# Patient Record
Sex: FEMALE | Race: WHITE | Employment: OTHER | URBAN - METROPOLITAN AREA
[De-identification: names, ages, dates, MRNs, and addresses within clinical notes are randomized per-mention and may not be internally consistent; named-entity substitution may affect disease eponyms.]

---

## 2019-09-25 ENCOUNTER — OFFICE VISIT (OUTPATIENT)
Dept: ORTHOPEDIC SURGERY | Age: 84
End: 2019-09-25
Payer: MEDICARE

## 2019-09-25 VITALS — BODY MASS INDEX: 21.53 KG/M2 | WEIGHT: 117 LBS | HEIGHT: 62 IN

## 2019-09-25 DIAGNOSIS — S83.207A TEAR OF MENISCUS OF LEFT KNEE, UNSPECIFIED MENISCUS, UNSPECIFIED TEAR TYPE, UNSPECIFIED WHETHER OLD OR CURRENT TEAR: ICD-10-CM

## 2019-09-25 DIAGNOSIS — M25.562 LEFT KNEE PAIN, UNSPECIFIED CHRONICITY: Primary | ICD-10-CM

## 2019-09-25 PROCEDURE — 1090F PRES/ABSN URINE INCON ASSESS: CPT | Performed by: ORTHOPAEDIC SURGERY

## 2019-09-25 PROCEDURE — 4004F PT TOBACCO SCREEN RCVD TLK: CPT | Performed by: ORTHOPAEDIC SURGERY

## 2019-09-25 PROCEDURE — 1123F ACP DISCUSS/DSCN MKR DOCD: CPT | Performed by: ORTHOPAEDIC SURGERY

## 2019-09-25 PROCEDURE — G8428 CUR MEDS NOT DOCUMENT: HCPCS | Performed by: ORTHOPAEDIC SURGERY

## 2019-09-25 PROCEDURE — 4040F PNEUMOC VAC/ADMIN/RCVD: CPT | Performed by: ORTHOPAEDIC SURGERY

## 2019-09-25 PROCEDURE — 99214 OFFICE O/P EST MOD 30 MIN: CPT | Performed by: ORTHOPAEDIC SURGERY

## 2019-09-25 PROCEDURE — G8400 PT W/DXA NO RESULTS DOC: HCPCS | Performed by: ORTHOPAEDIC SURGERY

## 2019-09-25 PROCEDURE — G8420 CALC BMI NORM PARAMETERS: HCPCS | Performed by: ORTHOPAEDIC SURGERY

## 2019-09-25 RX ORDER — ISOSORBIDE MONONITRATE 30 MG/1
30 TABLET, EXTENDED RELEASE ORAL 2 TIMES DAILY
COMMUNITY
Start: 2017-08-20

## 2019-09-25 RX ORDER — POTASSIUM CHLORIDE 20 MEQ/1
20 TABLET, EXTENDED RELEASE ORAL DAILY
COMMUNITY
Start: 2017-08-20

## 2019-09-25 RX ORDER — CLOPIDOGREL BISULFATE 75 MG/1
TABLET ORAL
COMMUNITY
Start: 2013-07-08 | End: 2019-11-25 | Stop reason: ALTCHOICE

## 2019-09-25 NOTE — PROGRESS NOTES
degrees of flexion. Negative Lachman's and posterior drawer. Negative patellar grind test and J sign. Compartments soft and compressible throughout leg. Active range of motion 0-120 with pain. Positive Maritza's positive Apley's, gait is antalgic        Imaging:  Xr Knee Left (min 4 Views)    Result Date: 9/25/2019  LOCATION: 200 EXAM: XR KNEE LEFT (MIN 4 VIEWS) COMPARISON: None HISTORY: Knee pain TECHNIQUE: 4 views of the left knee. FINDINGS: Mild tricompartmental degenerative changes are noted, greatest in the medial compartment. There is no joint effusion. There is normal alignment. The patella is well located. No fractures are identified. 1.  Mild degenerative changes as above. 2.  No acute findings. Herbert Hutchinson was seen today for knee pain. Diagnoses and all orders for this visit:    Left knee pain, unspecified chronicity  -     XR KNEE LEFT (MIN 4 VIEWS); Future    Tear of meniscus of left knee, unspecified meniscus, unspecified tear type, unspecified whether old or current tear  -     MRI Knee Left WO Contrast; Future        Patient seen and examined. X-rays reviewed. Patient has little to no arthritis noted on x-rays today. However patient's main complaint is instability of symptomatic knee. Exam and history is consistent with possible meniscus tear. MRI recommended for further evaluation management. Follow-up after MRI        Denys Hartmann DO         25 minutes was spent with patient. 50% or greater was spent counseling the patient.

## 2019-10-09 ENCOUNTER — HOSPITAL ENCOUNTER (OUTPATIENT)
Dept: MRI IMAGING | Age: 84
Discharge: HOME | End: 2019-10-11
Payer: MEDICARE

## 2019-10-09 DIAGNOSIS — S83.207A TEAR OF MENISCUS OF LEFT KNEE, UNSPECIFIED MENISCUS, UNSPECIFIED TEAR TYPE, UNSPECIFIED WHETHER OLD OR CURRENT TEAR: ICD-10-CM

## 2019-10-09 PROCEDURE — 73721 MRI JNT OF LWR EXTRE W/O DYE: CPT

## 2019-10-14 ENCOUNTER — OFFICE VISIT (OUTPATIENT)
Dept: ORTHOPEDIC SURGERY | Age: 84
End: 2019-10-14
Payer: MEDICARE

## 2019-10-14 VITALS — BODY MASS INDEX: 21.53 KG/M2 | WEIGHT: 117 LBS | HEIGHT: 62 IN | TEMPERATURE: 98 F

## 2019-10-14 DIAGNOSIS — S83.242A ACUTE MEDIAL MENISCUS TEAR, LEFT, INITIAL ENCOUNTER: ICD-10-CM

## 2019-10-14 DIAGNOSIS — M17.12 PRIMARY OSTEOARTHRITIS OF LEFT KNEE: Primary | ICD-10-CM

## 2019-10-14 PROCEDURE — G8427 DOCREV CUR MEDS BY ELIG CLIN: HCPCS | Performed by: ORTHOPAEDIC SURGERY

## 2019-10-14 PROCEDURE — 1123F ACP DISCUSS/DSCN MKR DOCD: CPT | Performed by: ORTHOPAEDIC SURGERY

## 2019-10-14 PROCEDURE — G8484 FLU IMMUNIZE NO ADMIN: HCPCS | Performed by: ORTHOPAEDIC SURGERY

## 2019-10-14 PROCEDURE — G8400 PT W/DXA NO RESULTS DOC: HCPCS | Performed by: ORTHOPAEDIC SURGERY

## 2019-10-14 PROCEDURE — 4040F PNEUMOC VAC/ADMIN/RCVD: CPT | Performed by: ORTHOPAEDIC SURGERY

## 2019-10-14 PROCEDURE — 4004F PT TOBACCO SCREEN RCVD TLK: CPT | Performed by: ORTHOPAEDIC SURGERY

## 2019-10-14 PROCEDURE — 99214 OFFICE O/P EST MOD 30 MIN: CPT | Performed by: ORTHOPAEDIC SURGERY

## 2019-10-14 PROCEDURE — G8420 CALC BMI NORM PARAMETERS: HCPCS | Performed by: ORTHOPAEDIC SURGERY

## 2019-10-14 PROCEDURE — 1090F PRES/ABSN URINE INCON ASSESS: CPT | Performed by: ORTHOPAEDIC SURGERY

## 2019-11-26 ENCOUNTER — HOSPITAL ENCOUNTER (OUTPATIENT)
Age: 84
Discharge: HOME | End: 2019-11-28
Payer: MEDICARE

## 2019-11-26 DIAGNOSIS — S83.242D TEAR OF MEDIAL MENISCUS OF LEFT KNEE, UNSPECIFIED TEAR TYPE, UNSPECIFIED WHETHER OLD OR CURRENT TEAR, SUBSEQUENT ENCOUNTER: ICD-10-CM

## 2019-11-26 LAB
ANION GAP SERPL CALCULATED.3IONS-SCNC: 16 MMOL/L (ref 7–16)
BUN BLDV-MCNC: 22 MG/DL (ref 8–23)
CALCIUM SERPL-MCNC: 9.3 MG/DL (ref 8.6–10.2)
CHLORIDE BLD-SCNC: 105 MMOL/L (ref 98–107)
CO2: 20 MMOL/L (ref 22–29)
CREAT SERPL-MCNC: 1.3 MG/DL (ref 0.5–1)
GFR AFRICAN AMERICAN: 47
GFR NON-AFRICAN AMERICAN: 39 ML/MIN/1.73
GLUCOSE BLD-MCNC: 96 MG/DL (ref 74–99)
HCT VFR BLD CALC: 43 % (ref 34–48)
HEMOGLOBIN: 12.8 G/DL (ref 11.5–15.5)
MCH RBC QN AUTO: 28.2 PG (ref 26–35)
MCHC RBC AUTO-ENTMCNC: 29.8 % (ref 32–34.5)
MCV RBC AUTO: 94.7 FL (ref 80–99.9)
PDW BLD-RTO: 14.4 FL (ref 11.5–15)
PLATELET # BLD: 216 E9/L (ref 130–450)
PMV BLD AUTO: 11.1 FL (ref 7–12)
POTASSIUM SERPL-SCNC: 5.1 MMOL/L (ref 3.5–5)
RBC # BLD: 4.54 E12/L (ref 3.5–5.5)
SODIUM BLD-SCNC: 141 MMOL/L (ref 132–146)
WBC # BLD: 7.3 E9/L (ref 4.5–11.5)

## 2019-11-26 PROCEDURE — 85027 COMPLETE CBC AUTOMATED: CPT

## 2019-11-26 PROCEDURE — 36415 COLL VENOUS BLD VENIPUNCTURE: CPT

## 2019-11-26 PROCEDURE — 80048 BASIC METABOLIC PNL TOTAL CA: CPT

## 2019-12-02 ENCOUNTER — ANESTHESIA EVENT (OUTPATIENT)
Dept: OPERATING ROOM | Age: 84
End: 2019-12-02
Payer: MEDICARE

## 2019-12-06 ENCOUNTER — ANESTHESIA (OUTPATIENT)
Dept: OPERATING ROOM | Age: 84
End: 2019-12-06
Payer: MEDICARE

## 2019-12-11 ENCOUNTER — HOSPITAL ENCOUNTER (OUTPATIENT)
Age: 84
Setting detail: OUTPATIENT SURGERY
Discharge: HOME | End: 2019-12-11
Attending: ORTHOPAEDIC SURGERY | Admitting: ORTHOPAEDIC SURGERY
Payer: MEDICARE

## 2019-12-11 VITALS
RESPIRATION RATE: 17 BRPM | DIASTOLIC BLOOD PRESSURE: 47 MMHG | OXYGEN SATURATION: 100 % | SYSTOLIC BLOOD PRESSURE: 132 MMHG | TEMPERATURE: 95.9 F

## 2019-12-11 VITALS
SYSTOLIC BLOOD PRESSURE: 150 MMHG | RESPIRATION RATE: 20 BRPM | BODY MASS INDEX: 20.91 KG/M2 | DIASTOLIC BLOOD PRESSURE: 80 MMHG | TEMPERATURE: 97.7 F | HEIGHT: 63 IN | OXYGEN SATURATION: 98 % | HEART RATE: 55 BPM | WEIGHT: 118 LBS

## 2019-12-11 DIAGNOSIS — S83.242D TEAR OF MEDIAL MENISCUS OF LEFT KNEE, UNSPECIFIED TEAR TYPE, UNSPECIFIED WHETHER OLD OR CURRENT TEAR, SUBSEQUENT ENCOUNTER: Primary | ICD-10-CM

## 2019-12-11 DIAGNOSIS — M17.12 PRIMARY OSTEOARTHRITIS OF LEFT KNEE: ICD-10-CM

## 2019-12-11 LAB
ANION GAP SERPL CALCULATED.3IONS-SCNC: 10 MMOL/L (ref 7–16)
BUN BLDV-MCNC: 24 MG/DL (ref 8–23)
CALCIUM SERPL-MCNC: 9.5 MG/DL (ref 8.6–10.2)
CHLORIDE BLD-SCNC: 109 MMOL/L (ref 98–107)
CO2: 26 MMOL/L (ref 22–29)
CREAT SERPL-MCNC: 1.3 MG/DL (ref 0.5–1)
GFR AFRICAN AMERICAN: 47
GFR NON-AFRICAN AMERICAN: 39 ML/MIN/1.73
GLUCOSE BLD-MCNC: 99 MG/DL (ref 74–99)
POTASSIUM REFLEX MAGNESIUM: 4.6 MMOL/L (ref 3.5–5)
SODIUM BLD-SCNC: 145 MMOL/L (ref 132–146)

## 2019-12-11 PROCEDURE — 3600000003 HC SURGERY LEVEL 3 BASE: Performed by: ORTHOPAEDIC SURGERY

## 2019-12-11 PROCEDURE — 7100000011 HC PHASE II RECOVERY - ADDTL 15 MIN: Performed by: ORTHOPAEDIC SURGERY

## 2019-12-11 PROCEDURE — 2709999900 HC NON-CHARGEABLE SUPPLY: Performed by: ORTHOPAEDIC SURGERY

## 2019-12-11 PROCEDURE — 2500000003 HC RX 250 WO HCPCS: Performed by: ORTHOPAEDIC SURGERY

## 2019-12-11 PROCEDURE — 3700000000 HC ANESTHESIA ATTENDED CARE: Performed by: ORTHOPAEDIC SURGERY

## 2019-12-11 PROCEDURE — 7100000010 HC PHASE II RECOVERY - FIRST 15 MIN: Performed by: ORTHOPAEDIC SURGERY

## 2019-12-11 PROCEDURE — 29880 ARTHRS KNE SRG MNISECTMY M&L: CPT | Performed by: ORTHOPAEDIC SURGERY

## 2019-12-11 PROCEDURE — 2580000003 HC RX 258: Performed by: ANESTHESIOLOGY

## 2019-12-11 PROCEDURE — 2500000003 HC RX 250 WO HCPCS

## 2019-12-11 PROCEDURE — 3700000001 HC ADD 15 MINUTES (ANESTHESIA): Performed by: ORTHOPAEDIC SURGERY

## 2019-12-11 PROCEDURE — 80048 BASIC METABOLIC PNL TOTAL CA: CPT

## 2019-12-11 PROCEDURE — 7100000000 HC PACU RECOVERY - FIRST 15 MIN: Performed by: ORTHOPAEDIC SURGERY

## 2019-12-11 PROCEDURE — 6360000002 HC RX W HCPCS: Performed by: ORTHOPAEDIC SURGERY

## 2019-12-11 PROCEDURE — 36415 COLL VENOUS BLD VENIPUNCTURE: CPT

## 2019-12-11 PROCEDURE — 6360000002 HC RX W HCPCS: Performed by: ANESTHESIOLOGY

## 2019-12-11 PROCEDURE — 7100000001 HC PACU RECOVERY - ADDTL 15 MIN: Performed by: ORTHOPAEDIC SURGERY

## 2019-12-11 PROCEDURE — 6360000002 HC RX W HCPCS

## 2019-12-11 PROCEDURE — 6370000000 HC RX 637 (ALT 250 FOR IP): Performed by: ANESTHESIOLOGY

## 2019-12-11 PROCEDURE — 3600000013 HC SURGERY LEVEL 3 ADDTL 15MIN: Performed by: ORTHOPAEDIC SURGERY

## 2019-12-11 RX ORDER — BUPIVACAINE HYDROCHLORIDE 2.5 MG/ML
INJECTION, SOLUTION EPIDURAL; INFILTRATION; INTRACAUDAL PRN
Status: DISCONTINUED | OUTPATIENT
Start: 2019-12-11 | End: 2019-12-11 | Stop reason: ALTCHOICE

## 2019-12-11 RX ORDER — PROPOFOL 10 MG/ML
INJECTION, EMULSION INTRAVENOUS PRN
Status: DISCONTINUED | OUTPATIENT
Start: 2019-12-11 | End: 2019-12-11 | Stop reason: SDUPTHER

## 2019-12-11 RX ORDER — FENTANYL CITRATE 50 UG/ML
INJECTION, SOLUTION INTRAMUSCULAR; INTRAVENOUS PRN
Status: DISCONTINUED | OUTPATIENT
Start: 2019-12-11 | End: 2019-12-11 | Stop reason: SDUPTHER

## 2019-12-11 RX ORDER — HYDROCODONE BITARTRATE AND ACETAMINOPHEN 7.5; 325 MG/1; MG/1
1 TABLET ORAL ONCE
Status: CANCELLED | OUTPATIENT
Start: 2019-12-11

## 2019-12-11 RX ORDER — HYDROMORPHONE HYDROCHLORIDE 1 MG/ML
0.25 INJECTION, SOLUTION INTRAMUSCULAR; INTRAVENOUS; SUBCUTANEOUS EVERY 5 MIN PRN
Status: DISCONTINUED | OUTPATIENT
Start: 2019-12-11 | End: 2019-12-11 | Stop reason: HOSPADM

## 2019-12-11 RX ORDER — LIDOCAINE HYDROCHLORIDE 20 MG/ML
INJECTION, SOLUTION INTRAVENOUS PRN
Status: DISCONTINUED | OUTPATIENT
Start: 2019-12-11 | End: 2019-12-11 | Stop reason: SDUPTHER

## 2019-12-11 RX ORDER — HYDROMORPHONE HYDROCHLORIDE 1 MG/ML
0.5 INJECTION, SOLUTION INTRAMUSCULAR; INTRAVENOUS; SUBCUTANEOUS EVERY 5 MIN PRN
Status: DISCONTINUED | OUTPATIENT
Start: 2019-12-11 | End: 2019-12-11 | Stop reason: HOSPADM

## 2019-12-11 RX ORDER — ASPIRIN 325 MG
325 TABLET, DELAYED RELEASE (ENTERIC COATED) ORAL 2 TIMES DAILY
Qty: 56 TABLET | Refills: 0 | Status: SHIPPED | OUTPATIENT
Start: 2019-12-11 | End: 2019-12-11 | Stop reason: HOSPADM

## 2019-12-11 RX ORDER — OXYCODONE HYDROCHLORIDE AND ACETAMINOPHEN 5; 325 MG/1; MG/1
1 TABLET ORAL EVERY 6 HOURS PRN
Qty: 28 TABLET | Refills: 0 | Status: SHIPPED | OUTPATIENT
Start: 2019-12-11 | End: 2019-12-11 | Stop reason: HOSPADM

## 2019-12-11 RX ORDER — DEXAMETHASONE SODIUM PHOSPHATE 10 MG/ML
INJECTION, SOLUTION INTRAMUSCULAR; INTRAVENOUS PRN
Status: DISCONTINUED | OUTPATIENT
Start: 2019-12-11 | End: 2019-12-11 | Stop reason: SDUPTHER

## 2019-12-11 RX ORDER — CEFAZOLIN SODIUM 2 G/50ML
2 SOLUTION INTRAVENOUS ONCE
Status: COMPLETED | OUTPATIENT
Start: 2019-12-11 | End: 2019-12-11

## 2019-12-11 RX ORDER — MEPERIDINE HYDROCHLORIDE 50 MG/ML
12.5 INJECTION INTRAMUSCULAR; INTRAVENOUS; SUBCUTANEOUS EVERY 5 MIN PRN
Status: COMPLETED | OUTPATIENT
Start: 2019-12-11 | End: 2019-12-11

## 2019-12-11 RX ORDER — HYDROCODONE BITARTRATE AND ACETAMINOPHEN 5; 325 MG/1; MG/1
1 TABLET ORAL ONCE
Status: COMPLETED | OUTPATIENT
Start: 2019-12-11 | End: 2019-12-11

## 2019-12-11 RX ORDER — EPINEPHRINE 1 MG/ML
INJECTION, SOLUTION, CONCENTRATE INTRAVENOUS PRN
Status: DISCONTINUED | OUTPATIENT
Start: 2019-12-11 | End: 2019-12-11 | Stop reason: ALTCHOICE

## 2019-12-11 RX ORDER — ASPIRIN 81 MG/1
81 TABLET ORAL DAILY
Qty: 56 TABLET | Refills: 0 | Status: SHIPPED | OUTPATIENT
Start: 2019-12-11 | End: 2020-01-08

## 2019-12-11 RX ORDER — MEPERIDINE HYDROCHLORIDE 25 MG/ML
INJECTION INTRAMUSCULAR; INTRAVENOUS; SUBCUTANEOUS
Status: DISCONTINUED
Start: 2019-12-11 | End: 2019-12-11 | Stop reason: HOSPADM

## 2019-12-11 RX ORDER — MEPERIDINE HYDROCHLORIDE 50 MG/ML
25 INJECTION INTRAMUSCULAR; INTRAVENOUS; SUBCUTANEOUS ONCE
Status: DISCONTINUED | OUTPATIENT
Start: 2019-12-11 | End: 2019-12-11 | Stop reason: ALTCHOICE

## 2019-12-11 RX ORDER — SODIUM CHLORIDE, SODIUM LACTATE, POTASSIUM CHLORIDE, CALCIUM CHLORIDE 600; 310; 30; 20 MG/100ML; MG/100ML; MG/100ML; MG/100ML
INJECTION, SOLUTION INTRAVENOUS CONTINUOUS
Status: DISCONTINUED | OUTPATIENT
Start: 2019-12-11 | End: 2019-12-11 | Stop reason: HOSPADM

## 2019-12-11 RX ORDER — HYDROCODONE BITARTRATE AND ACETAMINOPHEN 5; 325 MG/1; MG/1
1 TABLET ORAL EVERY 6 HOURS PRN
Qty: 28 TABLET | Refills: 0 | Status: SHIPPED | OUTPATIENT
Start: 2019-12-11 | End: 2019-12-11 | Stop reason: HOSPADM

## 2019-12-11 RX ORDER — GLYCOPYRROLATE 1 MG/5 ML
SYRINGE (ML) INTRAVENOUS PRN
Status: DISCONTINUED | OUTPATIENT
Start: 2019-12-11 | End: 2019-12-11 | Stop reason: SDUPTHER

## 2019-12-11 RX ORDER — HYDROCODONE BITARTRATE AND ACETAMINOPHEN 5; 325 MG/1; MG/1
1 TABLET ORAL EVERY 4 HOURS PRN
Qty: 42 TABLET | Refills: 0 | Status: SHIPPED | OUTPATIENT
Start: 2019-12-11 | End: 2019-12-18

## 2019-12-11 RX ORDER — MEPERIDINE HYDROCHLORIDE 50 MG/ML
25 INJECTION INTRAMUSCULAR; INTRAVENOUS; SUBCUTANEOUS ONCE
Status: CANCELLED | OUTPATIENT
Start: 2019-12-11

## 2019-12-11 RX ORDER — ONDANSETRON 2 MG/ML
INJECTION INTRAMUSCULAR; INTRAVENOUS PRN
Status: DISCONTINUED | OUTPATIENT
Start: 2019-12-11 | End: 2019-12-11 | Stop reason: SDUPTHER

## 2019-12-11 RX ADMIN — FENTANYL CITRATE 50 MCG: 50 INJECTION, SOLUTION INTRAMUSCULAR; INTRAVENOUS at 07:18

## 2019-12-11 RX ADMIN — DEXAMETHASONE SODIUM PHOSPHATE 10 MG: 10 INJECTION, SOLUTION INTRAMUSCULAR; INTRAVENOUS at 07:05

## 2019-12-11 RX ADMIN — Medication 0.1 MG: at 07:09

## 2019-12-11 RX ADMIN — CEFAZOLIN SODIUM 2 G: 2 SOLUTION INTRAVENOUS at 07:09

## 2019-12-11 RX ADMIN — ONDANSETRON HYDROCHLORIDE 4 MG: 2 INJECTION, SOLUTION INTRAMUSCULAR; INTRAVENOUS at 07:05

## 2019-12-11 RX ADMIN — FENTANYL CITRATE 50 MCG: 50 INJECTION, SOLUTION INTRAMUSCULAR; INTRAVENOUS at 07:35

## 2019-12-11 RX ADMIN — FENTANYL CITRATE 50 MCG: 50 INJECTION, SOLUTION INTRAMUSCULAR; INTRAVENOUS at 07:05

## 2019-12-11 RX ADMIN — HYDROMORPHONE HYDROCHLORIDE 0.25 MG: 1 INJECTION, SOLUTION INTRAMUSCULAR; INTRAVENOUS; SUBCUTANEOUS at 08:05

## 2019-12-11 RX ADMIN — MEPERIDINE HYDROCHLORIDE 12.5 MG: 50 INJECTION, SOLUTION INTRAMUSCULAR; INTRAVENOUS; SUBCUTANEOUS at 08:14

## 2019-12-11 RX ADMIN — LIDOCAINE HYDROCHLORIDE 60 MG: 20 INJECTION, SOLUTION INTRAVENOUS at 07:05

## 2019-12-11 RX ADMIN — HYDROCODONE BITARTRATE AND ACETAMINOPHEN 1 TABLET: 5; 325 TABLET ORAL at 11:33

## 2019-12-11 RX ADMIN — HYDROMORPHONE HYDROCHLORIDE 0.25 MG: 1 INJECTION, SOLUTION INTRAMUSCULAR; INTRAVENOUS; SUBCUTANEOUS at 08:10

## 2019-12-11 RX ADMIN — MEPERIDINE HYDROCHLORIDE 12.5 MG: 50 INJECTION, SOLUTION INTRAMUSCULAR; INTRAVENOUS; SUBCUTANEOUS at 08:19

## 2019-12-11 RX ADMIN — SODIUM CHLORIDE, POTASSIUM CHLORIDE, SODIUM LACTATE AND CALCIUM CHLORIDE: 600; 310; 30; 20 INJECTION, SOLUTION INTRAVENOUS at 07:00

## 2019-12-11 RX ADMIN — MEPERIDINE HYDROCHLORIDE 25 MG: 50 INJECTION, SOLUTION INTRAMUSCULAR; INTRAVENOUS; SUBCUTANEOUS at 08:30

## 2019-12-11 RX ADMIN — PROPOFOL 150 MG: 10 INJECTION, EMULSION INTRAVENOUS at 07:05

## 2019-12-11 ASSESSMENT — PULMONARY FUNCTION TESTS
PIF_VALUE: 1
PIF_VALUE: 8
PIF_VALUE: 4
PIF_VALUE: 8
PIF_VALUE: 1
PIF_VALUE: 8
PIF_VALUE: 1
PIF_VALUE: 8
PIF_VALUE: 2
PIF_VALUE: 8
PIF_VALUE: 5
PIF_VALUE: 8
PIF_VALUE: 8
PIF_VALUE: 5
PIF_VALUE: 8
PIF_VALUE: 15
PIF_VALUE: 22
PIF_VALUE: 9
PIF_VALUE: 8
PIF_VALUE: 15
PIF_VALUE: 8
PIF_VALUE: 14
PIF_VALUE: 2
PIF_VALUE: 26
PIF_VALUE: 3
PIF_VALUE: 5
PIF_VALUE: 16
PIF_VALUE: 5
PIF_VALUE: 8
PIF_VALUE: 16
PIF_VALUE: 8
PIF_VALUE: 5
PIF_VALUE: 8
PIF_VALUE: 8

## 2019-12-11 ASSESSMENT — PAIN DESCRIPTION - PAIN TYPE
TYPE: SURGICAL PAIN
TYPE: SURGICAL PAIN

## 2019-12-11 ASSESSMENT — PAIN - FUNCTIONAL ASSESSMENT: PAIN_FUNCTIONAL_ASSESSMENT: 0-10

## 2019-12-11 ASSESSMENT — PAIN DESCRIPTION - FREQUENCY: FREQUENCY: INTERMITTENT

## 2019-12-11 ASSESSMENT — PAIN DESCRIPTION - LOCATION
LOCATION: KNEE
LOCATION: KNEE

## 2019-12-11 ASSESSMENT — PAIN SCALES - GENERAL
PAINLEVEL_OUTOF10: 0
PAINLEVEL_OUTOF10: 3
PAINLEVEL_OUTOF10: 6
PAINLEVEL_OUTOF10: 4
PAINLEVEL_OUTOF10: 6
PAINLEVEL_OUTOF10: 4
PAINLEVEL_OUTOF10: 6
PAINLEVEL_OUTOF10: 6
PAINLEVEL_OUTOF10: 0
PAINLEVEL_OUTOF10: 0
PAINLEVEL_OUTOF10: 1

## 2019-12-11 ASSESSMENT — LIFESTYLE VARIABLES: SMOKING_STATUS: 1

## 2019-12-11 ASSESSMENT — PAIN DESCRIPTION - DESCRIPTORS
DESCRIPTORS: ACHING
DESCRIPTORS: ACHING

## 2019-12-11 ASSESSMENT — PAIN DESCRIPTION - ORIENTATION
ORIENTATION: RIGHT
ORIENTATION: LEFT

## 2019-12-17 ENCOUNTER — OFFICE VISIT (OUTPATIENT)
Dept: ORTHOPEDIC SURGERY | Age: 84
End: 2019-12-17

## 2019-12-17 VITALS — BODY MASS INDEX: 21.71 KG/M2 | WEIGHT: 118 LBS | HEIGHT: 62 IN

## 2019-12-17 DIAGNOSIS — Z98.890 S/P LEFT KNEE ARTHROSCOPY: Primary | ICD-10-CM

## 2019-12-17 PROCEDURE — 99024 POSTOP FOLLOW-UP VISIT: CPT | Performed by: PHYSICIAN ASSISTANT

## 2020-01-15 ENCOUNTER — OFFICE VISIT (OUTPATIENT)
Dept: ORTHOPEDIC SURGERY | Age: 85
End: 2020-01-15

## 2020-01-15 VITALS — BODY MASS INDEX: 21.71 KG/M2 | WEIGHT: 118 LBS | HEIGHT: 62 IN | TEMPERATURE: 98 F

## 2020-01-15 PROCEDURE — 99024 POSTOP FOLLOW-UP VISIT: CPT | Performed by: NURSE PRACTITIONER

## 2020-01-15 RX ORDER — BUDESONIDE 3 MG/1
CAPSULE, COATED PELLETS ORAL
Refills: 0 | COMMUNITY
Start: 2019-12-11

## 2020-01-15 RX ORDER — LORAZEPAM 0.5 MG/1
TABLET ORAL
COMMUNITY
Start: 2019-12-24 | End: 2020-01-15

## 2020-01-15 NOTE — PROGRESS NOTES
Subjective:        Ori Martínez is here for follow-up after left knee arthroscopy. Findings at surgery: medial meniscus tear, lateral meniscus tear, synovitis, DJD   The patient is not having any pain. . She denies fever, wound drainage, increasing redness, pus, increasing pain, increasing swelling. Post op problems reported: none. She is ambulating. Objective:           General :    alert, appears stated age and cooperative   Gait:  Normal.   Sutures:   out   Incision:  healing well, no significant drainage, no dehiscence, no significant erythema   Tenderness:  none   Flexion ROM:  full range of motion   Extension ROM:  full range of motion   Effusion:  none   DVT Evaluation:  No evidence of DVT seen on physical exam.           Assessment:     S/p:    PREOPERATIVE DIAGNOSES: (1) Left knee medial meniscus tear. (2)Tricompartmental synovitis. (3) DJD knee   POSTOPERATIVE DIAGNOSIS: Same as above +lateral meniscus tear  PROCEDURE: (1) Left knee arthroscopy with posterior horn medial   meniscectomy. (2) Tricompartmental synovectomy.  (3) Chondroplasty of mfc/patella/trochlea(4) lateral menisectomy     Plan:     HEP  Activity as tolerated  Fu prn

## 2020-07-28 ENCOUNTER — HOSPITAL ENCOUNTER (OUTPATIENT)
Dept: ULTRASOUND IMAGING | Age: 85
Discharge: HOME | End: 2020-07-30
Payer: MEDICARE

## 2020-07-28 PROCEDURE — 76770 US EXAM ABDO BACK WALL COMP: CPT

## 2020-07-28 PROCEDURE — 93975 VASCULAR STUDY: CPT

## 2020-11-17 ENCOUNTER — HOSPITAL ENCOUNTER (OUTPATIENT)
Dept: NUCLEAR MEDICINE | Age: 85
Discharge: HOME | End: 2020-11-17
Payer: MEDICARE

## 2020-11-17 PROCEDURE — A9572 INDIUM IN-111 PENTETREOTIDE: HCPCS | Performed by: RADIOLOGY

## 2020-11-17 PROCEDURE — 3430000000 HC RX DIAGNOSTIC RADIOPHARMACEUTICAL: Performed by: RADIOLOGY

## 2020-11-17 PROCEDURE — 78804 RP LOCLZJ TUM WHBDY 2+D IMG: CPT

## 2020-11-17 RX ADMIN — INDIUM IN -111 PENTETREOTIDE 6 MILLICURIE: KIT at 09:48

## 2020-11-18 ENCOUNTER — HOSPITAL ENCOUNTER (OUTPATIENT)
Dept: NUCLEAR MEDICINE | Age: 85
Discharge: HOME | End: 2020-11-18
Payer: MEDICARE

## 2020-11-18 PROCEDURE — 78831 RP LOCLZJ TUM SPECT 2 AREAS: CPT

## 2020-11-19 ENCOUNTER — HOSPITAL ENCOUNTER (OUTPATIENT)
Dept: NUCLEAR MEDICINE | Age: 85
Discharge: HOME | End: 2020-11-19
Payer: MEDICARE

## 2021-10-04 ENCOUNTER — HOSPITAL ENCOUNTER (OUTPATIENT)
Facility: HOSPITAL | Age: 86
Setting detail: OBSERVATION
Discharge: HOME | End: 2021-10-05
Attending: EMERGENCY MEDICINE | Admitting: INTERNAL MEDICINE

## 2021-10-04 ENCOUNTER — APPOINTMENT (OUTPATIENT)
Dept: GENERAL RADIOLOGY | Facility: HOSPITAL | Age: 86
End: 2021-10-04

## 2021-10-04 ENCOUNTER — APPOINTMENT (OUTPATIENT)
Dept: CT IMAGING | Facility: HOSPITAL | Age: 86
End: 2021-10-04

## 2021-10-04 DIAGNOSIS — Z20.822 LAB TEST NEGATIVE FOR COVID-19 VIRUS: ICD-10-CM

## 2021-10-04 DIAGNOSIS — I10 HYPERTENSION, UNSPECIFIED TYPE: ICD-10-CM

## 2021-10-04 DIAGNOSIS — R07.9 CHEST PAIN, UNSPECIFIED TYPE: Primary | ICD-10-CM

## 2021-10-04 LAB
ALBUMIN SERPL-MCNC: 3.6 G/DL (ref 3.5–5.2)
ALBUMIN/GLOB SERPL: 1.9 G/DL
ALP SERPL-CCNC: 72 U/L (ref 39–117)
ALT SERPL W P-5'-P-CCNC: 10 U/L (ref 1–33)
ANION GAP SERPL CALCULATED.3IONS-SCNC: 12 MMOL/L (ref 5–15)
APTT PPP: 24.2 SECONDS (ref 24–31)
AST SERPL-CCNC: 14 U/L (ref 1–32)
BASOPHILS # BLD AUTO: 0.1 10*3/MM3 (ref 0–0.2)
BASOPHILS NFR BLD AUTO: 0.8 % (ref 0–1.5)
BILIRUB SERPL-MCNC: <0.2 MG/DL (ref 0–1.2)
BUN SERPL-MCNC: 36 MG/DL (ref 8–23)
BUN/CREAT SERPL: 22.6 (ref 7–25)
CALCIUM SPEC-SCNC: 8.5 MG/DL (ref 8.6–10.5)
CHLORIDE SERPL-SCNC: 108 MMOL/L (ref 98–107)
CO2 SERPL-SCNC: 24 MMOL/L (ref 22–29)
CREAT SERPL-MCNC: 1.59 MG/DL (ref 0.57–1)
D DIMER PPP FEU-MCNC: 1.16 MG/L (FEU) (ref 0–0.59)
DEPRECATED RDW RBC AUTO: 45.5 FL (ref 37–54)
EOSINOPHIL # BLD AUTO: 0.5 10*3/MM3 (ref 0–0.4)
EOSINOPHIL NFR BLD AUTO: 5.3 % (ref 0.3–6.2)
ERYTHROCYTE [DISTWIDTH] IN BLOOD BY AUTOMATED COUNT: 14.2 % (ref 12.3–15.4)
GFR SERPL CREATININE-BSD FRML MDRD: 31 ML/MIN/1.73
GLOBULIN UR ELPH-MCNC: 1.9 GM/DL
GLUCOSE SERPL-MCNC: 97 MG/DL (ref 65–99)
HCT VFR BLD AUTO: 36.6 % (ref 34–46.6)
HGB BLD-MCNC: 12.3 G/DL (ref 12–15.9)
INR PPP: <0.93 (ref 0.93–1.1)
LYMPHOCYTES # BLD AUTO: 1.9 10*3/MM3 (ref 0.7–3.1)
LYMPHOCYTES NFR BLD AUTO: 21 % (ref 19.6–45.3)
MCH RBC QN AUTO: 30.1 PG (ref 26.6–33)
MCHC RBC AUTO-ENTMCNC: 33.6 G/DL (ref 31.5–35.7)
MCV RBC AUTO: 89.6 FL (ref 79–97)
MONOCYTES # BLD AUTO: 0.8 10*3/MM3 (ref 0.1–0.9)
MONOCYTES NFR BLD AUTO: 8.3 % (ref 5–12)
NEUTROPHILS NFR BLD AUTO: 6 10*3/MM3 (ref 1.7–7)
NEUTROPHILS NFR BLD AUTO: 64.6 % (ref 42.7–76)
NRBC BLD AUTO-RTO: 0 /100 WBC (ref 0–0.2)
NT-PROBNP SERPL-MCNC: 5284 PG/ML (ref 0–1800)
PLATELET # BLD AUTO: 202 10*3/MM3 (ref 140–450)
PMV BLD AUTO: 8.6 FL (ref 6–12)
POTASSIUM SERPL-SCNC: 3.9 MMOL/L (ref 3.5–5.2)
PROT SERPL-MCNC: 5.5 G/DL (ref 6–8.5)
PROTHROMBIN TIME: 10.1 SECONDS (ref 9.6–11.7)
RBC # BLD AUTO: 4.09 10*6/MM3 (ref 3.77–5.28)
SODIUM SERPL-SCNC: 144 MMOL/L (ref 136–145)
TROPONIN T SERPL-MCNC: <0.01 NG/ML (ref 0–0.03)
WBC # BLD AUTO: 9.2 10*3/MM3 (ref 3.4–10.8)

## 2021-10-04 PROCEDURE — U0005 INFEC AGEN DETEC AMPLI PROBE: HCPCS | Performed by: EMERGENCY MEDICINE

## 2021-10-04 PROCEDURE — 71045 X-RAY EXAM CHEST 1 VIEW: CPT

## 2021-10-04 PROCEDURE — 93005 ELECTROCARDIOGRAM TRACING: CPT | Performed by: EMERGENCY MEDICINE

## 2021-10-04 PROCEDURE — 99284 EMERGENCY DEPT VISIT MOD MDM: CPT

## 2021-10-04 PROCEDURE — U0003 INFECTIOUS AGENT DETECTION BY NUCLEIC ACID (DNA OR RNA); SEVERE ACUTE RESPIRATORY SYNDROME CORONAVIRUS 2 (SARS-COV-2) (CORONAVIRUS DISEASE [COVID-19]), AMPLIFIED PROBE TECHNIQUE, MAKING USE OF HIGH THROUGHPUT TECHNOLOGIES AS DESCRIBED BY CMS-2020-01-R: HCPCS | Performed by: EMERGENCY MEDICINE

## 2021-10-04 PROCEDURE — 80053 COMPREHEN METABOLIC PANEL: CPT | Performed by: EMERGENCY MEDICINE

## 2021-10-04 PROCEDURE — C9803 HOPD COVID-19 SPEC COLLECT: HCPCS

## 2021-10-04 PROCEDURE — 85379 FIBRIN DEGRADATION QUANT: CPT | Performed by: EMERGENCY MEDICINE

## 2021-10-04 PROCEDURE — 71275 CT ANGIOGRAPHY CHEST: CPT

## 2021-10-04 PROCEDURE — 85610 PROTHROMBIN TIME: CPT | Performed by: EMERGENCY MEDICINE

## 2021-10-04 PROCEDURE — 84484 ASSAY OF TROPONIN QUANT: CPT | Performed by: EMERGENCY MEDICINE

## 2021-10-04 PROCEDURE — 85730 THROMBOPLASTIN TIME PARTIAL: CPT | Performed by: EMERGENCY MEDICINE

## 2021-10-04 PROCEDURE — 83880 ASSAY OF NATRIURETIC PEPTIDE: CPT | Performed by: EMERGENCY MEDICINE

## 2021-10-04 PROCEDURE — 85025 COMPLETE CBC W/AUTO DIFF WBC: CPT | Performed by: EMERGENCY MEDICINE

## 2021-10-04 PROCEDURE — 93005 ELECTROCARDIOGRAM TRACING: CPT

## 2021-10-04 RX ORDER — SODIUM CHLORIDE 0.9 % (FLUSH) 0.9 %
10 SYRINGE (ML) INJECTION AS NEEDED
Status: DISCONTINUED | OUTPATIENT
Start: 2021-10-04 | End: 2021-10-05 | Stop reason: HOSPADM

## 2021-10-04 RX ADMIN — NITROGLYCERIN 1 INCH: 20 OINTMENT TOPICAL at 23:27

## 2021-10-05 ENCOUNTER — APPOINTMENT (OUTPATIENT)
Dept: CARDIOLOGY | Facility: HOSPITAL | Age: 86
End: 2021-10-05

## 2021-10-05 VITALS
SYSTOLIC BLOOD PRESSURE: 194 MMHG | DIASTOLIC BLOOD PRESSURE: 68 MMHG | RESPIRATION RATE: 13 BRPM | BODY MASS INDEX: 20.24 KG/M2 | TEMPERATURE: 98 F | OXYGEN SATURATION: 97 % | HEART RATE: 62 BPM | HEIGHT: 62 IN | WEIGHT: 110 LBS

## 2021-10-05 PROBLEM — R79.89 ELEVATED BRAIN NATRIURETIC PEPTIDE (BNP) LEVEL: Status: ACTIVE | Noted: 2021-10-05

## 2021-10-05 PROBLEM — R06.00 DYSPNEA: Status: ACTIVE | Noted: 2021-10-05

## 2021-10-05 PROBLEM — R07.9 CHEST PAIN: Status: ACTIVE | Noted: 2021-10-05

## 2021-10-05 LAB
BH CV LOWER VASCULAR LEFT COMMON FEMORAL AUGMENT: NORMAL
BH CV LOWER VASCULAR LEFT COMMON FEMORAL COMPETENT: NORMAL
BH CV LOWER VASCULAR LEFT COMMON FEMORAL COMPRESS: NORMAL
BH CV LOWER VASCULAR LEFT COMMON FEMORAL PHASIC: NORMAL
BH CV LOWER VASCULAR LEFT COMMON FEMORAL SPONT: NORMAL
BH CV LOWER VASCULAR LEFT DISTAL FEMORAL COMPRESS: NORMAL
BH CV LOWER VASCULAR LEFT GASTRONEMIUS COMPRESS: NORMAL
BH CV LOWER VASCULAR LEFT GREATER SAPH AK COMPRESS: NORMAL
BH CV LOWER VASCULAR LEFT GREATER SAPH BK COMPRESS: NORMAL
BH CV LOWER VASCULAR LEFT LESSER SAPH COMPRESS: NORMAL
BH CV LOWER VASCULAR LEFT MID FEMORAL AUGMENT: NORMAL
BH CV LOWER VASCULAR LEFT MID FEMORAL COMPETENT: NORMAL
BH CV LOWER VASCULAR LEFT MID FEMORAL COMPRESS: NORMAL
BH CV LOWER VASCULAR LEFT MID FEMORAL PHASIC: NORMAL
BH CV LOWER VASCULAR LEFT MID FEMORAL SPONT: NORMAL
BH CV LOWER VASCULAR LEFT PERONEAL COMPRESS: NORMAL
BH CV LOWER VASCULAR LEFT POPLITEAL AUGMENT: NORMAL
BH CV LOWER VASCULAR LEFT POPLITEAL COMPETENT: NORMAL
BH CV LOWER VASCULAR LEFT POPLITEAL COMPRESS: NORMAL
BH CV LOWER VASCULAR LEFT POPLITEAL PHASIC: NORMAL
BH CV LOWER VASCULAR LEFT POPLITEAL SPONT: NORMAL
BH CV LOWER VASCULAR LEFT POSTERIOR TIBIAL COMPRESS: NORMAL
BH CV LOWER VASCULAR LEFT PROXIMAL FEMORAL COMPRESS: NORMAL
BH CV LOWER VASCULAR LEFT SAPHENOFEMORAL JUNCTION COMPRESS: NORMAL
BH CV LOWER VASCULAR RIGHT COMMON FEMORAL AUGMENT: NORMAL
BH CV LOWER VASCULAR RIGHT COMMON FEMORAL COMPETENT: NORMAL
BH CV LOWER VASCULAR RIGHT COMMON FEMORAL COMPRESS: NORMAL
BH CV LOWER VASCULAR RIGHT COMMON FEMORAL PHASIC: NORMAL
BH CV LOWER VASCULAR RIGHT COMMON FEMORAL SPONT: NORMAL
BH CV LOWER VASCULAR RIGHT DISTAL FEMORAL COMPRESS: NORMAL
BH CV LOWER VASCULAR RIGHT GASTRONEMIUS COMPRESS: NORMAL
BH CV LOWER VASCULAR RIGHT GREATER SAPH AK COMPRESS: NORMAL
BH CV LOWER VASCULAR RIGHT GREATER SAPH BK COMPRESS: NORMAL
BH CV LOWER VASCULAR RIGHT LESSER SAPH COMPRESS: NORMAL
BH CV LOWER VASCULAR RIGHT MID FEMORAL AUGMENT: NORMAL
BH CV LOWER VASCULAR RIGHT MID FEMORAL COMPETENT: NORMAL
BH CV LOWER VASCULAR RIGHT MID FEMORAL COMPRESS: NORMAL
BH CV LOWER VASCULAR RIGHT MID FEMORAL PHASIC: NORMAL
BH CV LOWER VASCULAR RIGHT MID FEMORAL SPONT: NORMAL
BH CV LOWER VASCULAR RIGHT PERONEAL COMPRESS: NORMAL
BH CV LOWER VASCULAR RIGHT POPLITEAL AUGMENT: NORMAL
BH CV LOWER VASCULAR RIGHT POPLITEAL COMPETENT: NORMAL
BH CV LOWER VASCULAR RIGHT POPLITEAL COMPRESS: NORMAL
BH CV LOWER VASCULAR RIGHT POPLITEAL PHASIC: NORMAL
BH CV LOWER VASCULAR RIGHT POPLITEAL SPONT: NORMAL
BH CV LOWER VASCULAR RIGHT POSTERIOR TIBIAL COMPRESS: NORMAL
BH CV LOWER VASCULAR RIGHT PROXIMAL FEMORAL COMPRESS: NORMAL
BH CV LOWER VASCULAR RIGHT SAPHENOFEMORAL JUNCTION COMPRESS: NORMAL
BILIRUB UR QL STRIP: NEGATIVE
CLARITY UR: CLEAR
COLOR UR: YELLOW
CREAT UR-MCNC: 15.7 MG/DL
GLUCOSE UR STRIP-MCNC: NEGATIVE MG/DL
HGB UR QL STRIP.AUTO: NEGATIVE
HOLD SPECIMEN: NORMAL
HOLD SPECIMEN: NORMAL
KETONES UR QL STRIP: NEGATIVE
LEUKOCYTE ESTERASE UR QL STRIP.AUTO: NEGATIVE
MAXIMAL PREDICTED HEART RATE: 136 BPM
NITRITE UR QL STRIP: NEGATIVE
PH UR STRIP.AUTO: 6 [PH] (ref 5–8)
PROT UR QL STRIP: NEGATIVE
PROT UR-MCNC: 12 MG/DL
PROT/CREAT UR: 764.3 MG/G CREA (ref 0–200)
QT INTERVAL: 458 MS
QT INTERVAL: 465 MS
SARS-COV-2 RNA PNL SPEC NAA+PROBE: NOT DETECTED
SP GR UR STRIP: 1.01 (ref 1–1.03)
STRESS TARGET HR: 116 BPM
TROPONIN T SERPL-MCNC: <0.01 NG/ML (ref 0–0.03)
TSH SERPL DL<=0.05 MIU/L-ACNC: 4.07 UIU/ML (ref 0.27–4.2)
UROBILINOGEN UR QL STRIP: NORMAL
WHOLE BLOOD HOLD SPECIMEN: NORMAL
WHOLE BLOOD HOLD SPECIMEN: NORMAL

## 2021-10-05 PROCEDURE — 81003 URINALYSIS AUTO W/O SCOPE: CPT | Performed by: INTERNAL MEDICINE

## 2021-10-05 PROCEDURE — 82570 ASSAY OF URINE CREATININE: CPT | Performed by: INTERNAL MEDICINE

## 2021-10-05 PROCEDURE — 84156 ASSAY OF PROTEIN URINE: CPT | Performed by: INTERNAL MEDICINE

## 2021-10-05 PROCEDURE — 0 IOPAMIDOL PER 1 ML: Performed by: EMERGENCY MEDICINE

## 2021-10-05 PROCEDURE — 36415 COLL VENOUS BLD VENIPUNCTURE: CPT | Performed by: INTERNAL MEDICINE

## 2021-10-05 PROCEDURE — 93306 TTE W/DOPPLER COMPLETE: CPT | Performed by: INTERNAL MEDICINE

## 2021-10-05 PROCEDURE — 25010000002 HEPARIN (PORCINE) PER 1000 UNITS: Performed by: INTERNAL MEDICINE

## 2021-10-05 PROCEDURE — G0378 HOSPITAL OBSERVATION PER HR: HCPCS

## 2021-10-05 PROCEDURE — 93306 TTE W/DOPPLER COMPLETE: CPT

## 2021-10-05 PROCEDURE — 99204 OFFICE O/P NEW MOD 45 MIN: CPT | Performed by: INTERNAL MEDICINE

## 2021-10-05 PROCEDURE — 84484 ASSAY OF TROPONIN QUANT: CPT | Performed by: INTERNAL MEDICINE

## 2021-10-05 PROCEDURE — 93970 EXTREMITY STUDY: CPT

## 2021-10-05 PROCEDURE — 96374 THER/PROPH/DIAG INJ IV PUSH: CPT

## 2021-10-05 PROCEDURE — 93005 ELECTROCARDIOGRAM TRACING: CPT | Performed by: INTERNAL MEDICINE

## 2021-10-05 PROCEDURE — 84443 ASSAY THYROID STIM HORMONE: CPT | Performed by: INTERNAL MEDICINE

## 2021-10-05 PROCEDURE — 25010000002 HYDRALAZINE PER 20 MG: Performed by: INTERNAL MEDICINE

## 2021-10-05 PROCEDURE — 99236 HOSP IP/OBS SAME DATE HI 85: CPT | Performed by: INTERNAL MEDICINE

## 2021-10-05 PROCEDURE — 96372 THER/PROPH/DIAG INJ SC/IM: CPT

## 2021-10-05 RX ORDER — PANTOPRAZOLE SODIUM 40 MG/1
40 TABLET, DELAYED RELEASE ORAL
Status: DISCONTINUED | OUTPATIENT
Start: 2021-10-05 | End: 2021-10-05 | Stop reason: HOSPADM

## 2021-10-05 RX ORDER — LOSARTAN POTASSIUM 25 MG/1
100 TABLET ORAL
Status: DISCONTINUED | OUTPATIENT
Start: 2021-10-05 | End: 2021-10-05 | Stop reason: HOSPADM

## 2021-10-05 RX ORDER — POTASSIUM CHLORIDE 20 MEQ/1
20 TABLET, EXTENDED RELEASE ORAL DAILY
COMMUNITY

## 2021-10-05 RX ORDER — EVOLOCUMAB 140 MG/ML
INJECTION, SOLUTION SUBCUTANEOUS
COMMUNITY

## 2021-10-05 RX ORDER — AMLODIPINE BESYLATE 10 MG/1
10 TABLET ORAL DAILY
COMMUNITY

## 2021-10-05 RX ORDER — ASPIRIN 81 MG/1
162 TABLET, CHEWABLE ORAL DAILY
COMMUNITY

## 2021-10-05 RX ORDER — ONDANSETRON 4 MG/1
4 TABLET, ORALLY DISINTEGRATING ORAL EVERY 6 HOURS PRN
COMMUNITY

## 2021-10-05 RX ORDER — LORAZEPAM 0.5 MG/1
0.5 TABLET ORAL EVERY 8 HOURS PRN
COMMUNITY

## 2021-10-05 RX ORDER — SODIUM CHLORIDE 0.9 % (FLUSH) 0.9 %
10 SYRINGE (ML) INJECTION AS NEEDED
Status: DISCONTINUED | OUTPATIENT
Start: 2021-10-05 | End: 2021-10-05 | Stop reason: HOSPADM

## 2021-10-05 RX ORDER — HEPARIN SODIUM 5000 [USP'U]/ML
5000 INJECTION, SOLUTION INTRAVENOUS; SUBCUTANEOUS EVERY 8 HOURS SCHEDULED
Status: DISCONTINUED | OUTPATIENT
Start: 2021-10-05 | End: 2021-10-05 | Stop reason: HOSPADM

## 2021-10-05 RX ORDER — BUDESONIDE 3 MG/1
CAPSULE, COATED PELLETS ORAL
COMMUNITY

## 2021-10-05 RX ORDER — LOSARTAN POTASSIUM 100 MG/1
100 TABLET ORAL DAILY
COMMUNITY

## 2021-10-05 RX ORDER — ISOSORBIDE MONONITRATE 30 MG/1
30 TABLET, EXTENDED RELEASE ORAL DAILY
COMMUNITY

## 2021-10-05 RX ORDER — LORAZEPAM 0.5 MG/1
TABLET ORAL
COMMUNITY
End: 2021-10-05 | Stop reason: SDUPTHER

## 2021-10-05 RX ORDER — AMLODIPINE BESYLATE 5 MG/1
10 TABLET ORAL DAILY
Status: DISCONTINUED | OUTPATIENT
Start: 2021-10-05 | End: 2021-10-05 | Stop reason: HOSPADM

## 2021-10-05 RX ORDER — ASPIRIN 81 MG/1
81 TABLET ORAL DAILY
Status: DISCONTINUED | OUTPATIENT
Start: 2021-10-05 | End: 2021-10-05 | Stop reason: HOSPADM

## 2021-10-05 RX ORDER — LORAZEPAM 0.5 MG/1
0.5 TABLET ORAL EVERY 8 HOURS PRN
Status: DISCONTINUED | OUTPATIENT
Start: 2021-10-05 | End: 2021-10-05 | Stop reason: HOSPADM

## 2021-10-05 RX ORDER — FUROSEMIDE 40 MG/1
40 TABLET ORAL DAILY
Status: DISCONTINUED | OUTPATIENT
Start: 2021-10-05 | End: 2021-10-05 | Stop reason: HOSPADM

## 2021-10-05 RX ORDER — DEXLANSOPRAZOLE 60 MG/1
60 CAPSULE, DELAYED RELEASE ORAL DAILY
COMMUNITY

## 2021-10-05 RX ORDER — ISOSORBIDE MONONITRATE 30 MG/1
60 TABLET, EXTENDED RELEASE ORAL
Status: DISCONTINUED | OUTPATIENT
Start: 2021-10-05 | End: 2021-10-05 | Stop reason: HOSPADM

## 2021-10-05 RX ORDER — NITROGLYCERIN 0.4 MG/1
0.4 TABLET SUBLINGUAL
COMMUNITY

## 2021-10-05 RX ORDER — HYDRALAZINE HYDROCHLORIDE 25 MG/1
25 TABLET, FILM COATED ORAL AS NEEDED
COMMUNITY

## 2021-10-05 RX ORDER — CHLORHEXIDINE GLUCONATE 0.12 MG/ML
RINSE ORAL
COMMUNITY
End: 2021-10-05

## 2021-10-05 RX ORDER — ONDANSETRON 2 MG/ML
4 INJECTION INTRAMUSCULAR; INTRAVENOUS EVERY 6 HOURS PRN
Status: DISCONTINUED | OUTPATIENT
Start: 2021-10-05 | End: 2021-10-05 | Stop reason: HOSPADM

## 2021-10-05 RX ORDER — NITROGLYCERIN 0.4 MG/1
0.4 TABLET SUBLINGUAL
Status: DISCONTINUED | OUTPATIENT
Start: 2021-10-05 | End: 2021-10-05 | Stop reason: HOSPADM

## 2021-10-05 RX ORDER — POTASSIUM CHLORIDE 20 MEQ/1
10 TABLET, EXTENDED RELEASE ORAL DAILY
Status: DISCONTINUED | OUTPATIENT
Start: 2021-10-05 | End: 2021-10-05 | Stop reason: HOSPADM

## 2021-10-05 RX ORDER — ACETAMINOPHEN 325 MG/1
650 TABLET ORAL EVERY 4 HOURS PRN
Status: DISCONTINUED | OUTPATIENT
Start: 2021-10-05 | End: 2021-10-05 | Stop reason: HOSPADM

## 2021-10-05 RX ORDER — ASPIRIN 81 MG/1
162 TABLET, CHEWABLE ORAL DAILY
Status: DISCONTINUED | OUTPATIENT
Start: 2021-10-05 | End: 2021-10-05 | Stop reason: HOSPADM

## 2021-10-05 RX ORDER — HYDRALAZINE HYDROCHLORIDE 20 MG/ML
10 INJECTION INTRAMUSCULAR; INTRAVENOUS EVERY 6 HOURS PRN
Status: DISCONTINUED | OUTPATIENT
Start: 2021-10-05 | End: 2021-10-05 | Stop reason: HOSPADM

## 2021-10-05 RX ORDER — SODIUM CHLORIDE 0.9 % (FLUSH) 0.9 %
10 SYRINGE (ML) INJECTION EVERY 12 HOURS SCHEDULED
Status: DISCONTINUED | OUTPATIENT
Start: 2021-10-05 | End: 2021-10-05 | Stop reason: HOSPADM

## 2021-10-05 RX ADMIN — METOPROLOL TARTRATE 25 MG: 25 TABLET, FILM COATED ORAL at 08:51

## 2021-10-05 RX ADMIN — FUROSEMIDE 40 MG: 40 TABLET ORAL at 08:51

## 2021-10-05 RX ADMIN — ASPIRIN 162 MG: 81 TABLET, CHEWABLE ORAL at 16:09

## 2021-10-05 RX ADMIN — HYDRALAZINE HYDROCHLORIDE 10 MG: 20 INJECTION INTRAMUSCULAR; INTRAVENOUS at 12:07

## 2021-10-05 RX ADMIN — POTASSIUM CHLORIDE 10 MEQ: 1500 TABLET, EXTENDED RELEASE ORAL at 08:51

## 2021-10-05 RX ADMIN — ASPIRIN 81 MG: 81 TABLET, COATED ORAL at 08:51

## 2021-10-05 RX ADMIN — AMLODIPINE BESYLATE 10 MG: 5 TABLET ORAL at 16:09

## 2021-10-05 RX ADMIN — IOPAMIDOL 100 ML: 755 INJECTION, SOLUTION INTRAVENOUS at 00:14

## 2021-10-05 RX ADMIN — HEPARIN SODIUM 5000 UNITS: 5000 INJECTION, SOLUTION INTRAVENOUS; SUBCUTANEOUS at 06:08

## 2021-10-05 NOTE — H&P
"    [facility] Medicine Services      Patient Name: Shannen Eller  : 1935  MRN: [id number]  Primary Care Physician:  Provider, No Known  Date of admission: 10/4/2021      Subjective      Chief Complaint: Chest pain and shortness of breath.    History of Present Illness:   86-year-old  female from [place], with history of CAD-s/p CABG and 1 coronary stent, HTN, HLP, PAD, and GERD, presented to the ED complaining of moderate substernal chest pain with dyspnea and anxiety, onset at rest at 6 PM.  She also complaint of mild painless swelling in both feet and ankles for the past 3 days.  Patient was given aspirin 324 mg, 2 sublingual nitroglycerin, and Ativan with resolution of chest pain.  Patient states that she is on river cruise from [place] to [place], gently started on  and supposed to and on 10/9/2021.  Denies any other complaint.  She denies history of congestive heart failure or kidney disease.  No medical records available in our system.    Emergency department course:  Afebrile, slightly bradycardic, hypotensive, normokalemic, no acute distress.  Physical examination per ED physician was significant for presence of trace pedal edema.  First troponin level was normal.  Labs were significant for proBNP 5284 [unknown baseline], creatinine 1.59 [unknown baseline], BUN 36, GFR 31, and D-dimer 1.16.  COVID-19 was negative.  EKG showed sinus bradycardia and possible old anterior infarct.  Chest x-ray did not show any acute process.  CTA of the chest reported \"1. No evidence of pulmonary embolism.   2. Interlobular septal thickening in the lung bases, compatible with interstitial pulmonary edema. An atypical infectious process is also differential but considered less likely.  3. Emphysema.  4. Atherosclerosis, status post CABG \".    ROS   Please refer to HPI. All other systems were reviewed and were negative.     Personal History     Past Medical History:   Diagnosis Date "   • CAD (coronary artery disease)    • Carotid artery stenosis    • GERD (gastroesophageal reflux disease)    • Hearing loss    • Hyperlipidemia    • Hypertension    • Peripheral arterial disease (HCC)    • PVD (peripheral vascular disease) (HCC)        Past Surgical History:   Procedure Laterality Date   • APPENDECTOMY     • CAROTID ENDARTERECTOMY     • CATARACT EXTRACTION     • HYSTERECTOMY     • [place] REANOMAL CORON ART PA ORIGIN BY GRAFT         Family History: family history includes Heart disease in her brother and sister. Otherwise pertinent FHx was reviewed and not pertinent to current issue.    Social History:  reports that she has been smoking. She has been smoking about 0.50 packs per day. She does not have any smokeless tobacco history on file. She reports current alcohol use of about 2.0 standard drinks of alcohol per week. She reports that she does not use drugs.    Home Medications:  Prior to Admission Medications     None            Allergies:  No Known Allergies    Objective      Vitals:   Temp:  [98 °F (36.7 °C)-98.7 °F (37.1 °C)] 98.7 °F (37.1 °C)  Heart Rate:  [57-59] 57  Resp:  [15-17] 15  BP: (158-183)/(63-65) 181/63    Physical Exam   General: Thin built, alert and oriented x3, no acute distress.   Eyes:  Show anicteric sclerae, moist conjunctivae with no lig lag; PERRLA.  HENT:  Normocephalic, atraumatic, hard of hearing, moist oral mucosa.  Neck: Supple, loud bilateral carotid bruit, no JVP, no thyroid or lymph node enlargement, trachea central,   Lungs: Decreased air entry.  Occasional expiratory rhonchi.  Heart: RRR, no murmur or rub.   Abdomen:  Soft, not tender, not distended, no organomegaly, bowel sounds positive.   Extremities: Mild bilateral pitting pedal edema, no calf tenderness, very dark legs, normal range of movement, pedal pulses intact.   Skin: No rash, lesions, or ulcers.   Neurology:  Grossly intact.   Psychiatric exam: Pleasant, cooperative, appropriate mood and affect, intact  judgment and insight.      Result Review    Result Review:  I have personally reviewed the results from the time of this admission to 10/5/2021 03:19 EDT and agree with these findings:  [x]  Laboratory  [x]  Microbiology  [x]  Radiology  [x]  EKG/Telemetry   [x]  Cardiology/Vascular   []  Pathology  []  Old records  []  Other:    Assessment/Plan        Active Hospital Problems:  Active Hospital Problems    Diagnosis    • **Chest pain    • Dyspnea    • Elevated serum creatinine      Assessment:  1.  Chest pain-rule out ACS.    2.  Dyspnea, pedal edema, and elevated BNP level -suspected CHF.    3.  Elevated creatinine level-suspected stage III CKD.  -Baseline kidney function is unknown.    4.  CAD-s/p CABG and stent placement.    5.  PAD.  -S/p left carotid endarterectomy.  -S/p aortobifemoral bypass graft.  -Bilateral carotid bruit.    6.  Essential hypertension-uncontrolled.    7.  Hyperlipidemia.    8.  GERD.    9.  Tobacco dependence.  -Chest x-ray showing emphysema.  Patient not on oxygen, inhalers or nebulizers.    Plan:  -Observation with telemetry.  -Consult cardiology.  -Trend troponin level.  Nuclear stress test in the a.m.  -2D echo.  To evaluate ejection fraction and cardiac structures.  -Venous Doppler lower extremities to rule out DVT.  -Start patient on metoprolol, oral Lasix, as needed IV hydralazine, daily aspirin, pending cardiology recommendations.  -Check TSH level.  Urine protein creatinine ratio.  -Avoid nephrotoxic medications.  Monitor renal function.  -Tobacco cessation program.  -DVT prophylaxis with subcutaneous heparin.    DVT prophylaxis:  No DVT prophylaxis order currently exists.    CODE STATUS:    Limited Support to NOT Include: Cardioversion/Defibrillation; Dialysis; Intubation; Vasopressors  Level Of Support Discussed With: Patient  Code Status: No CPR  Medical Interventions (Level of Support Prior to Arrest): Limited    Admission Status:  I believe this patient meets observation  status.    I discussed the patient's findings and my recommendations with patient.    This patient has been examined wearing appropriate Personal Protective Equipment and discussed with hospital infection control department. 10/05/21      Signature:

## 2021-10-05 NOTE — CASE MANAGEMENT/SOCIAL WORK
Discharge Planning Assessment  [facility]     Patient Name: Shannen Eller  MRN: [id number]  Today's Date: 10/5/2021    Admit Date: 10/4/2021    Discharge Needs Assessment     Row Name 10/05/21 1243       Living Environment    Lives With  child(gwen), adult    Name(s) of Who Lives With Patient  States she lives in a mother-in-law's wing of her daughter and son-in-law's home    Current Living Arrangements  home/apartment/condo    Primary Care Provided by  self    Provides Primary Care For  no one    Family Caregiver if Needed  child(gwen), adult    Quality of Family Relationships  involved;supportive    Able to Return to Prior Arrangements  yes       Resource/Environmental Concerns    Resource/Environmental Concerns  none       Transition Planning    Patient/Family Anticipates Transition to  home with family    Patient/Family Anticipated Services at Transition  none    Transportation Anticipated  family or friend will provide pt states her children are coming from [place], [place] to pick her up.She states it is a 6-8 hour drive       Discharge Needs Assessment    Equipment Currently Used at Home  none    Concerns to be Addressed  no discharge needs identified;denies needs/concerns at this time    Equipment Needed After Discharge  none        Discharge Plan     Row Name 10/05/21 1244       Plan    Plan  Home    Patient/Family in Agreement with Plan  yes    Plan Comments  Pt denies dc needs.States her family is on their way from [place] to pick her up. She confirms she has a PCP Dr. Earline Romo. She states she uses [facility] in [place], [place] (added to pharmacy list in [facility]).        Continued Care and Services - Admitted Since 10/4/2021    Coordination has not been started for this encounter.       Expected Discharge Date and Time     Expected Discharge Date Expected Discharge Time    Oct 5, 2021         Demographic Summary     Row Name 10/05/21 1242       General Information    Admission Type  observation    Arrived From  other  (see comments) Pt reports she was on a riverboat trip from [place] on the [facility]    Referral Source  admission list;high risk screening    Reason for Consult  discharge planning    Preferred Language  English     Used During This Interaction  no    General Information Comments  I met with patient in room wearing PPE: mask and goggles. Maintained distance greater than six feet and spent </=15 minutes in the room       Contact Information    Permission Granted to Share Info With      Contact Information Obtained for            Rut Burleson RN, [facility]  Office: [phone]  Fax: [phone]  [email or web address]      Rut Burleson RN

## 2021-10-05 NOTE — ED PROVIDER NOTES
Subjective   86-year-old female with a history of coronary artery disease complains of moderate substernal chest pressure with dyspnea and anxiety onset at rest at 6 PM.  Patient was given aspirin 324 mg, 2 sublingual nitroglycerin, Ativan with resolution of chest pain.  Patient has a history of hypertension and reports normally good control and compliance with her medicine.  Patient denies any street of DVT or PE but complains of painless left ankle and foot swelling for the past 3 days, mild.  Patient is on a river cruise from [place] to [place] that she started on Tuesday.          Review of Systems   Respiratory: Positive for shortness of breath.    Cardiovascular: Positive for chest pain and leg swelling.   Psychiatric/Behavioral: The patient is nervous/anxious.    All other systems reviewed and are negative.      No past medical history on file.    No Known Allergies    No past surgical history on file.    No family history on file.    Social History     Socioeconomic History   • Marital status:      Spouse name: Not on file   • Number of children: Not on file   • Years of education: Not on file   • Highest education level: Not on file           Objective   Physical Exam  Constitutional:       Appearance: She is well-developed.   HENT:      Head: Normocephalic and atraumatic.      Mouth/Throat:      Mouth: Mucous membranes are moist.      Pharynx: Oropharynx is clear.   Eyes:      Conjunctiva/sclera: Conjunctivae normal.      Pupils: Pupils are equal, round, and reactive to light.   Cardiovascular:      Rate and Rhythm: Normal rate and regular rhythm.      Heart sounds: Normal heart sounds.   Pulmonary:      Effort: Pulmonary effort is normal.      Breath sounds: Normal breath sounds.   Abdominal:      General: Bowel sounds are normal. There is no distension.      Palpations: Abdomen is soft.      Tenderness: There is no abdominal tenderness.   Musculoskeletal:      Comments: Trace pedal edema, no  calf tenderness   Skin:     General: Skin is warm and dry.      Capillary Refill: Capillary refill takes less than 2 seconds.   Neurological:      General: No focal deficit present.      Mental Status: She is alert and oriented to person, place, and time.   Psychiatric:         Mood and Affect: Mood normal.         Behavior: Behavior normal.         Procedures           ED Course  ED Course as of Oct 05 0124   Mon Oct 04, 2021   2250 EKG interpretation: Sinus bradycardia rate 56, no STEMI seen    [JR]      ED Course User Index  [JR] Chris Frederick MD                                           [facility]  Number of Diagnoses or Management Options  Chest pain, unspecified type  Hypertension, unspecified type  Lab test negative for COVID-19 virus  Diagnosis management comments: Results for orders placed or performed during the hospital encounter of 10/04/21  -COVID-19,CEPHEID/RANDI/BDMAX,COR/DEBRA/PAD/BETH IN-HOUSE(OR EMERGENT/ADD-ON),NP SWAB IN TRANSPORT MEDIA 3-4 HR TAT, RT-PCR - Swab, Nasopharynx  Specimen: Nasopharynx; Swab       Result                      Value             Ref Range           COVID19                     Not Detected      Not Detected*  -Comprehensive Metabolic Panel  Specimen: Blood       Result                      Value             Ref Range           Glucose                     97                65 - 99 mg/dL       BUN                         36 (H)            8 - 23 mg/dL        Creatinine                  1.59 (H)          0.57 - 1.00 *       Sodium                      144               136 - 145 mm*       Potassium                   3.9               3.5 - 5.2 mm*       Chloride                    108 (H)           98 - 107 mmo*       CO2                         24.0              22.0 - 29.0 *       Calcium                     8.5 (L)           8.6 - 10.5 m*       Total Protein               5.5 (L)           6.0 - 8.5 g/*       Albumin                     3.60              3.50 - 5.20 *       ALT  (SGPT)                  10                1 - 33 U/L          AST (SGOT)                  14                1 - 32 U/L          Alkaline Phosphatase        72                39 - 117 U/L        Total Bilirubin             <0.2              0.0 - 1.2 mg*       eGFR Non  Amer       31 (L)            >60 mL/min/1*       Globulin                    1.9               gm/dL               A/G Ratio                   1.9               g/dL                BUN/Creatinine Ratio        22.6              7.0 - 25.0          Anion Gap                   12.0              5.0 - 15.0 m*  -Protime-INR  Specimen: Blood       Result                      Value             Ref Range           Protime                     10.1              9.6 - 11.7 S*       INR                         <0.93 (L)         0.93 - 1.10    -aPTT  Specimen: Blood       Result                      Value             Ref Range           PTT                         24.2              24.0 - 31.0 *  -D-dimer, Quantitative  Specimen: Blood       Result                      Value             Ref Range           D-Dimer, Quantitative       1.16 (H)          0.00 - 0.59 *  -Troponin  Specimen: Blood       Result                      Value             Ref Range           Troponin T                  <0.010            0.000 - 0.03*  -CBC Auto Differential  Specimen: Blood       Result                      Value             Ref Range           WBC                         9.20              3.40 - 10.80*       RBC                         4.09              3.77 - 5.28 *       Hemoglobin                  12.3              12.0 - 15.9 *       Hematocrit                  36.6              34.0 - 46.6 %       MCV                         89.6              79.0 - 97.0 *       MCH                         30.1              26.6 - 33.0 *       MCHC                        33.6              31.5 - 35.7 *       RDW                         14.2              12.3 - 15.4 %       RDW-SD                       45.5              37.0 - 54.0 *       MPV                         8.6               6.0 - 12.0 fL       Platelets                   202               140 - 450 10*       Neutrophil %                64.6              42.7 - 76.0 %       Lymphocyte %                21.0              19.6 - 45.3 %       Monocyte %                  8.3               5.0 - 12.0 %        Eosinophil %                5.3               0.3 - 6.2 %         Basophil %                  0.8               0.0 - 1.5 %         Neutrophils, Absolute       6.00              1.70 - 7.00 *       Lymphocytes, Absolute       1.90              0.70 - 3.10 *       Monocytes, Absolute         0.80              0.10 - 0.90 *       Eosinophils, Absolute       0.50 (H)          0.00 - 0.40 *       Basophils, Absolute         0.10              0.00 - 0.20 *       nRBC                        0.0               0.0 - 0.2 /1*  -BNP  Specimen: Blood       Result                      Value             Ref Range           proBNP                      5,284.0 (H)       0.0-1,800.0 *  -ECG 12 Lead       Result                      Value             Ref Range           QT Interval                 471               ms             -Green Top (Gel)       Result                      Value             Ref Range           Extra Tube                                                    Hold for add-ons.  -Lavender Top       Result                      Value             Ref Range           Extra Tube                                                    hold for add-on  -Gold Top - SST       Result                      Value             Ref Range           Extra Tube                                                    Hold for add-ons.  -Light Blue Top       Result                      Value             Ref Range           Extra Tube                                                    hold for add-on    CT Chest Pulmonary Embolism   Final Result        1. No  evidence of pulmonary embolism.     2. Interlobular septal thickening in the lung bases, compatible with interstitial pulmonary edema. An atypical infectious process is also differential but considered less likely.    3. Emphysema.    4. Atherosclerosis, status post CABG.        Electronically signed by:  Adal De León M.D.      10/4/2021 10:31 PM     XR Chest 1 View   Final Result    Impression:    No evidence of an acute pleural or pulmonary parenchymal abnormality.        Electronically signed by:  Adal De León M.D.      10/4/2021 10:26 PM       Patient appears well, no chest pain or shortness of air, vital signs stable, will observe.       Amount and/or Complexity of Data Reviewed  Clinical lab tests: reviewed and ordered  Tests in the radiology section of CPT®: reviewed and ordered  Tests in the medicine section of CPT®: reviewed and ordered        Final diagnoses:   Chest pain, unspecified type   Hypertension, unspecified type   Lab test negative for COVID-19 virus       ED Disposition  ED Disposition     ED Disposition Condition Comment    Decision to Admit            No follow-up provider specified.       Medication List      No changes were made to your prescriptions during this visit.          Chris Frederick MD  10/05/21 0124

## 2021-10-05 NOTE — DISCHARGE SUMMARY
"             [facility] Medicine Services  DISCHARGE SUMMARY    Patient Name: Shannen Eller  : 1935  MRN: [id number]    Date of Admission: 10/4/2021  Date of Discharge: 10/5/2021  Primary Care Physician: Provider, No Known      Presenting Problem:   Chest pain, unspecified type [R07.9]    Active and Resolved Hospital Problems:  Active Hospital Problems    Diagnosis POA   • **Chest pain [R07.9] Yes   • Dyspnea [R06.00] Yes   • Elevated serum creatinine [R79.89] Yes      Resolved Hospital Problems   No resolved problems to display.         Hospital Course     Hospital Course: 86-year-old pleasant female with known history of coronary disease, followed by cardiologist in [place], hypertension, PVD, hyperlipidemia and ELVA.  Patient presented to [facility] ED complaining of substernal chest pressure and shortness of breath.  Denies diaphoresis, no PND, orthopnea.  Of note, patient was on a cruise visiting [place] when event \".  On presentation she was hypertensive with systolic blood pressure in the 150s to 160's -170 mmHg.  EKG showed no acute ST elevation or sign of ischemia , and cardiac biomarkers with troponin negative x2, and proBNP of 5284, but patient does not appear to be in acute of florid heart failure, and slightly elevated D-dimer of 1.16..  Chest x-ray showed no evidence of acute pleural or pulmonary parenchymal abnormalities.  CTA of the chest negative for pulmonary embolism.  She was evaluated by cardiologist, and underwent a bedside 2D echo with result pending.  On examination, she was hypertensive and recommendation is to optimize blood pressure treatment with the ACL medication prior to discharge home.    Prior to discharge home, patient reported feeling better and chest pressure has  much improved.  So far, patient declined further cardiac evaluation here to [facility] and stated that she would like to follow-up with with cardiologist in [place].  She was admitted in observation and " treated for chest pressure and hypertensive urgency.  She is to continue on home antihypertensive medication as outlined in discharge summary and to follow-up with her primary care physician and cardiologist.  On discharge, she denies chest pain no shortness of breath, dizziness or palpitation.         DISCHARGE Follow Up Recommendations for labs and diagnostics:   Optimize blood pressure control and follow-up with cardiologist and PCP  Continue antihypertensive medication      Reasons For Change In Medications and Indications for New Medications:      Day of Discharge     Vital Signs:  Temp:  [97.9 °F (36.6 °C)-98.7 °F (37.1 °C)] 98 °F (36.7 °C)  Heart Rate:  [56-64] 62  Resp:  [11-19] 13  BP: (158-202)/(55-77) 194/68      Physical Exam  Constitutional:       General: She is not in acute distress.     Appearance: Normal appearance. She is not ill-appearing.   HENT:      Head: Normocephalic.      Right Ear: Tympanic membrane normal.   Eyes:      Extraocular Movements: Extraocular movements intact.      Pupils: Pupils are equal, round, and reactive to light.   Cardiovascular:      Rate and Rhythm: Normal rate.      Pulses: Normal pulses.   Pulmonary:      Effort: Pulmonary effort is normal.   Abdominal:      General: Abdomen is flat.   Musculoskeletal:         General: Normal range of motion.      Cervical back: Normal range of motion and neck supple.   Skin:     General: Skin is warm.   Neurological:      General: No focal deficit present.      Mental Status: She is alert.   Psychiatric:         Thought Content: Thought content normal.            Pertinent  and/or Most Recent Results     LAB RESULTS:      Lab 10/04/21  2303   WBC 9.20   HEMOGLOBIN 12.3   HEMATOCRIT 36.6   PLATELETS 202   NEUTROS ABS 6.00   LYMPHS ABS 1.90   MONOS ABS 0.80   EOS ABS 0.50*   MCV 89.6   PROTIME 10.1   APTT 24.2         Lab 10/05/21  0618 10/04/21  2303   SODIUM  --  144   POTASSIUM  --  3.9   CHLORIDE  --  108*   CO2  --  24.0   ANION  GAP  --  12.0   BUN  --  36*   CREATININE  --  1.59*   GLUCOSE  --  97   CALCIUM  --  8.5*   TSH 4.070  --          Lab 10/04/21  2303   TOTAL PROTEIN 5.5*   ALBUMIN 3.60   GLOBULIN 1.9   ALT (SGPT) 10   AST (SGOT) 14   BILIRUBIN <0.2   ALK PHOS 72         Lab 10/05/21  0618 10/04/21  2303   PROBNP  --  5,284.0*   TROPONIN T <0.010 <0.010   PROTIME  --  10.1   INR  --  <0.93*                 Brief Urine Lab Results  (Last result in the past 365 days)      Color   Clarity   Blood   Leuk Est   Nitrite   Protein   CREAT   Urine HCG        10/05/21 0351             15.7       10/05/21 0351 Yellow Clear Negative Negative Negative Negative             Microbiology Results (last 10 days)     Procedure Component Value - Date/Time    COVID-19,CEPHEID/RANDI/BDMAX,COR/DEBRA/PAD/BETH IN-HOUSE(OR EMERGENT/ADD-ON),NP SWAB IN TRANSPORT MEDIA 3-4 HR TAT, RT-PCR - Swab, Nasopharynx [[id number]]  (Normal) Collected: 10/04/21 2330    Lab Status: Final result Specimen: Swab from Nasopharynx Updated: 10/05/21 0027     COVID19 Not Detected    Narrative:      Fact sheet for providers: https://www.fda.gov/media/827296/download     Fact sheet for patients: https://www.fda.gov/media/875364/download  Fact sheet for providers: https://www.fda.gov/media/833799/download     Fact sheet for patients: https://www.fda.gov/media/458294/download          XR Chest 1 View    Result Date: 10/5/2021  Impression: Impression: No evidence of an acute pleural or pulmonary parenchymal abnormality. Electronically signed by:  Adal De León M.D.  10/4/2021 10:26 PM    CT Chest Pulmonary Embolism    Result Date: 10/5/2021  Impression: 1. No evidence of pulmonary embolism. 2. Interlobular septal thickening in the lung bases, compatible with interstitial pulmonary edema. An atypical infectious process is also differential but considered less likely. 3. Emphysema. 4. Atherosclerosis, status post CABG. Electronically signed by:  Adal De León M.D.  10/4/2021 10:31  PM      Results for orders placed during the hospital encounter of 10/04/21    Duplex Venous Lower Extremity - Bilateral CAR    Interpretation Summary  · Normal bilateral lower extremity venous duplex scan.      Results for orders placed during the hospital encounter of 10/04/21    Duplex Venous Lower Extremity - Bilateral CAR    Interpretation Summary  · Normal bilateral lower extremity venous duplex scan.          Labs Pending at Discharge:      Procedures Performed           Consults:   Consults     Date and Time Order Name Status Description    10/5/2021  3:25 AM Inpatient Cardiology Consult Completed     10/5/2021  1:22 AM Inpatient Hospitalist Consult Completed             Discharge Details        Discharge Medications      Continue These Medications      Instructions Start Date   amLODIPine 10 MG tablet  Commonly known as: NORVASC   10 mg, Oral, Daily      aspirin 81 MG chewable tablet   162 mg, Oral, Daily      Budesonide 3 MG 24 hr capsule  Commonly known as: ENTOCORT EC   budesonide DR - ER 3 mg capsule,delayed,extended release   take 3 capsule by mouth once daily      Dexilant 60 MG capsule  Generic drug: dexlansoprazole   60 mg, Oral, Daily      hydrALAZINE 25 MG tablet  Commonly known as: APRESOLINE   25 mg, Oral, As Needed, BP>150      isosorbide mononitrate 30 MG 24 hr tablet  Commonly known as: IMDUR   30 mg, Oral, Daily      LORazepam 0.5 MG tablet  Commonly known as: ATIVAN   0.5 mg, Oral, Every 8 Hours PRN      losartan 100 MG tablet  Commonly known as: COZAAR   100 mg, Oral, Daily      metoprolol tartrate 25 MG tablet  Commonly known as: LOPRESSOR   12.5 mg, Oral, 2 Times Daily      nitroglycerin 0.4 MG SL tablet  Commonly known as: NITROSTAT   0.4 mg, Sublingual, Every 5 Minutes PRN      ondansetron ODT 4 MG disintegrating tablet  Commonly known as: ZOFRAN-ODT   4 mg, Translingual, Every 6 Hours PRN      potassium chloride 20 MEQ CR tablet  Commonly known as: K-DUR,KLOR-CON   20 mEq, Oral,  Daily      Repatha SureClick solution auto-injector SureClick injection  Generic drug: Evolocumab   Repatha SureClick 140 mg/mL subcutaneous pen injector   inject 1 subcutaneously every 2 weeks for cholesterol      sertraline 50 MG tablet  Commonly known as: ZOLOFT   50 mg, Oral, Daily             No Known Allergies      Discharge Disposition: Home in stable condition  Home or Self Care    Diet:  Hospital:  Diet Order   Procedures   • Diet Cardiac; Healthy Heart         Discharge Activity: Ad whitney. as tolerated        CODE STATUS:  Code Status and Medical Interventions:   Ordered at: 10/05/21 0317     Limited Support to NOT Include:    Cardioversion/Defibrillation    Dialysis    Intubation    Vasopressors     Level Of Support Discussed With:    Patient     Code Status:    No CPR     Medical Interventions (Level of Support Prior to Arrest):    Limited         No future appointments.      Time spent on Discharge including face to face service:  30 minutes    Signature:

## 2021-10-05 NOTE — CONSULTS
CARDIOLOGY CONSULT NOTE      Referring Provider: Dr. Hunt    Reason for Consultation: Pressure, shortness of breath    Attending: Ranjith Hunt MD    Chief complaint    Chest pressure, shortness of breath    Subjective .     History of present illness:  Shannen Eller is a 86 y.o. female who presents with complaints of chest pressure and shortness of breath.     Patient came to the emergency room with complaints of substernal chest pain that started last night around 6 PM.  She has a history of coronary artery disease.  Additional past medical history is significant for hypertension, dyslipidemia and peripheral vascular disease.    Patient has had previous bypass surgery in 2016 and post bypass stenting.  She has had previous aortobifem bypass in 99.,  Previous left carotid endarterectomy and known renal artery stenosis    Patient was on a river cruise from [place] to [place] that started on Tuesday last week.    I reviewed medical records and care everywhere that were available.  There is a cardiac catheterization from August 2017.  LIMA to the LAD was open, saphenous vein graft to the OM 2 was 100% occluded.  Saphenous vein graft to the right PDA was patent.  Medical treatment was recommended.  Echocardiogram done in 2017 showed her ejection fraction to be 55% with no significant valvular flow abnormalities detected    Patient reports she follows regularly with a cardiologist who is outside of [facility]    Review of Systems   Constitutional: Negative for decreased appetite and diaphoresis.   HENT: Negative for congestion, hearing loss and nosebleeds.    Cardiovascular: Positive for chest pain and dyspnea on exertion. Negative for claudication, irregular heartbeat, leg swelling, near-syncope, orthopnea, palpitations, paroxysmal nocturnal dyspnea and syncope.   Respiratory: Negative for cough, shortness of breath and sleep disturbances due to breathing.    Endocrine: Negative for  polyuria.   Hematologic/Lymphatic: Does not bruise/bleed easily.   Skin: Negative for itching and rash.   Musculoskeletal: Negative for back pain, muscle weakness and myalgias.   Gastrointestinal: Negative for abdominal pain, change in bowel habit and nausea.   Genitourinary: Negative for dysuria, flank pain, frequency and hesitancy.   Neurological: Positive for weakness. Negative for dizziness and tremors.   Psychiatric/Behavioral: Negative for altered mental status. The patient does not have insomnia.        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    Scheduled Meds:amLODIPine, 10 mg, Oral, Daily  aspirin, 162 mg, Oral, Daily  aspirin, 81 mg, Oral, Daily  furosemide, 40 mg, Oral, Daily  heparin (porcine), 5,000 Units, Subcutaneous, Q8H  isosorbide mononitrate, 60 mg, Oral, Q24H  losartan, 100 mg, Oral, Q24H  metoprolol tartrate, 25 mg, Oral, Q12H  pantoprazole, 40 mg, Oral, BID AC  potassium chloride, 10 mEq, Oral, Daily  sertraline, 50 mg, Oral, Daily  sodium chloride, 10 mL, Intravenous, Q12H      Continuous Infusions:   PRN  "Meds:.•  acetaminophen  •  hydrALAZINE  •  LORazepam  •  nitroglycerin  •  ondansetron  •  sodium chloride  •  sodium chloride    Objective     VITAL SIGNS  Vitals:    10/05/21 0851 10/05/21 1033 10/05/21 1137 10/05/21 1609   BP: 180/69 177/55 (!) 187/63 (!) 186/70   BP Location:   Right arm Left arm   Patient Position:   Lying Lying   Pulse: 64  56 62   Resp:   15 11   Temp:   97.9 °F (36.6 °C)    TempSrc:   Oral    SpO2:   97% 100%   Weight:  49.9 kg (110 lb)     Height:  157.5 cm (62\")         Flowsheet Rows      First Filed Value   Admission Height  157.5 cm (62\") Documented at 10/04/2021 2238   Admission Weight  49.9 kg (110 lb) Documented at 10/04/2021 2238          Body mass index is 20.12 kg/m².     TELEMETRY: SR    Physical Exam:  Vitals reviewed.   Constitutional:       General: Not in acute distress.     Appearance: Normal appearance. Well-developed.   Eyes:      Pupils: Pupils are equal, round, and reactive to light.   HENT:      Head: Normocephalic and atraumatic.   Neck:      Vascular: No JVD.   Pulmonary:      Effort: Pulmonary effort is normal.      Breath sounds: Normal breath sounds.   Cardiovascular:      Normal rate. Regular rhythm.   Pulses:     Intact distal pulses.   Abdominal:      General: There is no distension.      Palpations: Abdomen is soft.      Tenderness: There is no abdominal tenderness.   Musculoskeletal: Normal range of motion.      Cervical back: Normal range of motion and neck supple. Skin:     General: Skin is warm and dry.      Comments: Changes consistent with chronic venous stasis   Neurological:      Mental Status: Alert and oriented to person, place, and time.          Results Review:   I reviewed the patient's new clinical results.    CBC    Results from last 7 days   Lab Units 10/04/21  2303   WBC 10*3/mm3 9.20   HEMOGLOBIN g/dL 12.3   PLATELETS 10*3/mm3 202     BMP   Results from last 7 days   Lab Units 10/04/21  2303   SODIUM mmol/L 144   POTASSIUM mmol/L 3.9 "   CHLORIDE mmol/L 108*   CO2 mmol/L 24.0   BUN mg/dL 36*   CREATININE mg/dL 1.59*   GLUCOSE mg/dL 97     Cr Clearance Estimated Creatinine Clearance: 20.7 mL/min (A) (by C-G formula based on SCr of 1.59 mg/dL (H)).  Coag   Results from last 7 days   Lab Units 10/04/21  2303   INR  <0.93*   APTT seconds 24.2     HbA1C No results found for: HGBA1C  Blood Glucose No results found for: POCGLU  Infection     CMP   Results from last 7 days   Lab Units 10/04/21  2303   SODIUM mmol/L 144   POTASSIUM mmol/L 3.9   CHLORIDE mmol/L 108*   CO2 mmol/L 24.0   BUN mg/dL 36*   CREATININE mg/dL 1.59*   GLUCOSE mg/dL 97   ALBUMIN g/dL 3.60   BILIRUBIN mg/dL <0.2   ALK PHOS U/L 72   AST (SGOT) U/L 14   ALT (SGPT) U/L 10     ABG      UA    Results from last 7 days   Lab Units 10/05/21  0351   NITRITE UA  Negative     BETO  No results found for: POCMETH, POCAMPHET, POCBARBITUR, POCBENZO, POCCOCAINE, POCOPIATES, POCOXYCODO, POCPHENCYC, POCPROPOXY, POCTHC, POCTRICYC  Lysis Labs   Results from last 7 days   Lab Units 10/04/21  2303   INR  <0.93*   APTT seconds 24.2   HEMOGLOBIN g/dL 12.3   PLATELETS 10*3/mm3 202   CREATININE mg/dL 1.59*     Radiology(recent) XR Chest 1 View    Result Date: 10/5/2021  Impression: No evidence of an acute pleural or pulmonary parenchymal abnormality. Electronically signed by:  Adal De León M.D.  10/4/2021 10:26 PM    CT Chest Pulmonary Embolism    Result Date: 10/5/2021  1. No evidence of pulmonary embolism. 2. Interlobular septal thickening in the lung bases, compatible with interstitial pulmonary edema. An atypical infectious process is also differential but considered less likely. 3. Emphysema. 4. Atherosclerosis, status post CABG. Electronically signed by:  Adal De León M.D.  10/4/2021 10:31 PM      Results from last 7 days   Lab Units 10/05/21  0618   TROPONIN T ng/mL <0.010       Imaging Results (Last 24 Hours)     Procedure Component Value Units Date/Time    CT Chest Pulmonary Embolism [[id number]]  Collected: 10/05/21 0027     Updated: 10/05/21 0032    Narrative:      CT ANGIOGRAM OF THE CHEST    INDICATION: Shortness of breath, elevated d-dimer    TECHNIQUE: CT scan of the chest was performed following the administration of 100 mL Isovue-370 intravenous contrast. Imaging was performed to optimize evaluation of the pulmonary arterial system. CT dose lowering techniques were used, to include:   automated exposure control, adjustment for patient size, and / or use of iterative reconstruction. MIP images were also generated.    COMPARISON: None    FINDINGS:  Vasculature: There are no filling defects in the central, lobar, proximal segmental or distal segmental pulmonary arteries. There is no evidence of right heart strain or pulmonary infarction. Thoracic aortic and great vessel and coronary artery   calcifications are present. Status post CABG.    Mediastinum / Pleura: There is no pericardial or pleural effusion. There is no mediastinal, hilar or axillary lymphadenopathy.    Airways / Lungs: The central airways are patent.  There is no pneumothorax. Upper lobe predominant centrilobular emphysematous changes are present. There is interlobular septal thickening in the lung bases. Atelectatic changes are present in the left   lung base.    Bones: No destructive osseous lesion is identified.    Upper Abdomen: Limited images below the diaphragm demonstrate no acute abnormality.      Impression:        1. No evidence of pulmonary embolism.   2. Interlobular septal thickening in the lung bases, compatible with interstitial pulmonary edema. An atypical infectious process is also differential but considered less likely.  3. Emphysema.  4. Atherosclerosis, status post CABG.    Electronically signed by:  Adal De León M.D.    10/4/2021 10:31 PM    XR Chest 1 View [[id number]] Collected: 10/05/21 0025     Updated: 10/05/21 0027    Narrative:      Examination: AP view of the chest    Indication: Chest pain    Comparison: No  prior study is available for comparison.    Findings:  The cardiomediastinal silhouette is enlarged, status post median sternotomy and CABG.    There is no consolidative airspace disease, pleural effusion or pulmonary edema. There is no pneumothorax.     No acute osseous abnormality is identified.      Impression:      Impression:  No evidence of an acute pleural or pulmonary parenchymal abnormality.    Electronically signed by:  Adal De León M.D.    10/4/2021 10:26 PM          EKG              I personally viewed and interpreted the patient's EKG/Telemetry data:    ECHOCARDIOGRAM:      STRESS MYOVIEW:    CARDIAC CATHETERIZATION:    OTHER:         Assessment/Plan       Chest pain    Dyspnea    Elevated serum creatinine      Assessment:    Chest Pain  CORADO/Edema     -Elevated BNP  CAD/Prior CABG     -MI ruled out     -EKG no acute ST/T wave abnormalities  HTN  Dyslipidemia  PAD: multiple vascular surgeries, Carotid/Aorta Fem Bypass      Plan:    Currently pain free  Will resume home Rx  BP not well controlled but has not had meds today  Pt. Does not wish to have any cardiac testing here, would like to return to primary cardiologist in [place].   Will resume home Rx  Additional recommendations per Dr. Reich    Patient is seen and examined and findings are verified.  Patient is chest pain-free.  No significant leg edema    Blood pressure is slightly elevated    Normal S1 and S2.  No pericardial rub or murmur.  Chest is clear to auscultation    Patient is presented with chest pain and leg edema.  She is ruled out for myocardial infarction.  EKG showed no active changes suggestive of ischemia.    I would recommend that patient should proceed with stress test considering her previous cardiac history.  However patient does not want to proceed with any cardiac work-up.  After discussion with patient we deferred the testing for her primary cardiologist.  Her family is coming today to pick her up.  Patient is from [place].   Patient is advised to follow-up with her cardiologist.    I discussed the patients findings and my recommendations with patient and RN    Brandt Reich MD  10/05/21  16:27 EDT

## 2021-10-06 LAB — QT INTERVAL: 471 MS

## 2021-10-06 NOTE — CASE MANAGEMENT/SOCIAL WORK
Case Management Discharge Note                Final Discharge Disposition Code: 01 - home or self-care     Rut Burleson RN, [facility]  Office: [phone]  Fax: [phone]  [email or web address]

## 2021-10-10 LAB
BH CV ECHO MEAS - ACS: 1.6 CM
BH CV ECHO MEAS - AO MAX PG: 11.9 MMHG
BH CV ECHO MEAS - AO MEAN PG: 6.9 MMHG
BH CV ECHO MEAS - AO ROOT AREA (BSA CORRECTED): 2
BH CV ECHO MEAS - AO ROOT AREA: 6.9 CM^2
BH CV ECHO MEAS - AO ROOT DIAM: 3 CM
BH CV ECHO MEAS - AO V2 MAX: 172.7 CM/SEC
BH CV ECHO MEAS - AO V2 MEAN: 124.4 CM/SEC
BH CV ECHO MEAS - AO V2 VTI: 46.4 CM
BH CV ECHO MEAS - AORTIC HR: 57.1 BPM
BH CV ECHO MEAS - AORTIC R-R: 1.1 SEC
BH CV ECHO MEAS - ASC AORTA: 2.8 CM
BH CV ECHO MEAS - BSA(HAYCOCK): 1.5 M^2
BH CV ECHO MEAS - BSA: 1.5 M^2
BH CV ECHO MEAS - BZI_BMI: 20.1 KILOGRAMS/M^2
BH CV ECHO MEAS - BZI_METRIC_HEIGHT: 157.5 CM
BH CV ECHO MEAS - BZI_METRIC_WEIGHT: 49.9 KG
BH CV ECHO MEAS - CI(AO): 12.3 L/MIN/M^2
BH CV ECHO MEAS - CO(AO): 18.3 L/MIN
BH CV ECHO MEAS - EDV(CUBED): 142.5 ML
BH CV ECHO MEAS - EDV(MOD-SP4): 64.1 ML
BH CV ECHO MEAS - EDV(TEICH): 130.9 ML
BH CV ECHO MEAS - EF(CUBED): 72.7 %
BH CV ECHO MEAS - EF(MOD-BP): 55 %
BH CV ECHO MEAS - EF(MOD-SP4): 54.6 %
BH CV ECHO MEAS - EF(TEICH): 64 %
BH CV ECHO MEAS - ESV(CUBED): 38.9 ML
BH CV ECHO MEAS - ESV(MOD-SP4): 29.1 ML
BH CV ECHO MEAS - ESV(TEICH): 47.1 ML
BH CV ECHO MEAS - FS: 35.1 %
BH CV ECHO MEAS - IVS/LVPW: 1
BH CV ECHO MEAS - IVSD: 1.2 CM
BH CV ECHO MEAS - LA DIMENSION(2D): 3.8 CM
BH CV ECHO MEAS - LV DIASTOLIC VOL/BSA (35-75): 43.2 ML/M^2
BH CV ECHO MEAS - LV MASS(C)D: 233.1 GRAMS
BH CV ECHO MEAS - LV MASS(C)DI: 157.2 GRAMS/M^2
BH CV ECHO MEAS - LV SYSTOLIC VOL/BSA (12-30): 19.6 ML/M^2
BH CV ECHO MEAS - LVIDD: 5.2 CM
BH CV ECHO MEAS - LVIDS: 3.4 CM
BH CV ECHO MEAS - LVOT AREA: 2.4 CM^2
BH CV ECHO MEAS - LVOT DIAM: 1.8 CM
BH CV ECHO MEAS - LVPWD: 1.1 CM
BH CV ECHO MEAS - MR MAX PG: 130.8 MMHG
BH CV ECHO MEAS - MR MAX VEL: 571.5 CM/SEC
BH CV ECHO MEAS - MV A MAX VEL: 78.2 CM/SEC
BH CV ECHO MEAS - MV DEC SLOPE: 549.9 CM/SEC^2
BH CV ECHO MEAS - MV DEC TIME: 0.26 SEC
BH CV ECHO MEAS - MV E MAX VEL: 140.6 CM/SEC
BH CV ECHO MEAS - MV E/A: 1.8
BH CV ECHO MEAS - MV MAX PG: 7.2 MMHG
BH CV ECHO MEAS - MV MEAN PG: 2.1 MMHG
BH CV ECHO MEAS - MV V2 MAX: 134.6 CM/SEC
BH CV ECHO MEAS - MV V2 MEAN: 65.3 CM/SEC
BH CV ECHO MEAS - MV V2 VTI: 42 CM
BH CV ECHO MEAS - PA MAX PG (FULL): 2.5 MMHG
BH CV ECHO MEAS - PA MAX PG: 3.6 MMHG
BH CV ECHO MEAS - PA MEAN PG (FULL): 1.4 MMHG
BH CV ECHO MEAS - PA MEAN PG: 2.1 MMHG
BH CV ECHO MEAS - PA V2 MAX: 94.4 CM/SEC
BH CV ECHO MEAS - PA V2 MEAN: 68.3 CM/SEC
BH CV ECHO MEAS - PA V2 VTI: 25.9 CM
BH CV ECHO MEAS - PI END-D VEL: 139.2 CM/SEC
BH CV ECHO MEAS - PI MAX PG: 17.2 MMHG
BH CV ECHO MEAS - PI MAX VEL: 207.1 CM/SEC
BH CV ECHO MEAS - PULM A REVS DUR: 0.25 SEC
BH CV ECHO MEAS - PULM A REVS VEL: 33.4 CM/SEC
BH CV ECHO MEAS - PULM DIAS VEL: 83.4 CM/SEC
BH CV ECHO MEAS - PULM S/D: 0.9
BH CV ECHO MEAS - PULM SYS VEL: 75.4 CM/SEC
BH CV ECHO MEAS - RAP SYSTOLE: 3 MMHG
BH CV ECHO MEAS - RV MAX PG: 1.1 MMHG
BH CV ECHO MEAS - RV MEAN PG: 0.67 MMHG
BH CV ECHO MEAS - RV V1 MAX: 52.4 CM/SEC
BH CV ECHO MEAS - RV V1 MEAN: 39.3 CM/SEC
BH CV ECHO MEAS - RV V1 VTI: 14.8 CM
BH CV ECHO MEAS - RVDD: 2 CM
BH CV ECHO MEAS - RVSP: 34.4 MMHG
BH CV ECHO MEAS - SI(AO): 215.7 ML/M^2
BH CV ECHO MEAS - SI(CUBED): 69.9 ML/M^2
BH CV ECHO MEAS - SI(MOD-SP4): 23.6 ML/M^2
BH CV ECHO MEAS - SI(TEICH): 56.5 ML/M^2
BH CV ECHO MEAS - SV(AO): 319.8 ML
BH CV ECHO MEAS - SV(CUBED): 103.6 ML
BH CV ECHO MEAS - SV(MOD-SP4): 35 ML
BH CV ECHO MEAS - SV(TEICH): 83.8 ML
BH CV ECHO MEAS - TR MAX VEL: 279.6 CM/SEC

## 2022-01-07 ENCOUNTER — HOSPITAL ENCOUNTER (OUTPATIENT)
Age: 87
Discharge: HOME | End: 2022-01-07
Payer: MEDICARE

## 2022-01-07 LAB
ALBUMIN SERPL-MCNC: 3.6 G/DL (ref 3.5–5.2)
ALP BLD-CCNC: 60 U/L (ref 35–104)
ALT SERPL-CCNC: 8 U/L (ref 0–32)
ANION GAP SERPL CALCULATED.3IONS-SCNC: 11 MMOL/L (ref 7–16)
AST SERPL-CCNC: 12 U/L (ref 0–31)
BASOPHILS ABSOLUTE: 0.01 E9/L (ref 0–0.2)
BASOPHILS RELATIVE PERCENT: 0.1 % (ref 0–2)
BILIRUB SERPL-MCNC: 0.4 MG/DL (ref 0–1.2)
BUN BLDV-MCNC: 26 MG/DL (ref 6–23)
CALCIUM SERPL-MCNC: 8.9 MG/DL (ref 8.6–10.2)
CHLORIDE BLD-SCNC: 104 MMOL/L (ref 98–107)
CO2: 26 MMOL/L (ref 22–29)
CREAT SERPL-MCNC: 1.7 MG/DL (ref 0.5–1)
EOSINOPHILS ABSOLUTE: 0.31 E9/L (ref 0.05–0.5)
EOSINOPHILS RELATIVE PERCENT: 3.5 % (ref 0–6)
GFR AFRICAN AMERICAN: 35
GFR NON-AFRICAN AMERICAN: 29 ML/MIN/1.73
GLUCOSE BLD-MCNC: 97 MG/DL (ref 74–99)
HCT VFR BLD CALC: 34.7 % (ref 34–48)
HEMOGLOBIN: 11 G/DL (ref 11.5–15.5)
IMMATURE GRANULOCYTES #: 0.03 E9/L
IMMATURE GRANULOCYTES %: 0.3 % (ref 0–5)
LYMPHOCYTES ABSOLUTE: 1.3 E9/L (ref 1.5–4)
LYMPHOCYTES RELATIVE PERCENT: 14.6 % (ref 20–42)
MCH RBC QN AUTO: 28.9 PG (ref 26–35)
MCHC RBC AUTO-ENTMCNC: 31.7 % (ref 32–34.5)
MCV RBC AUTO: 91.3 FL (ref 80–99.9)
MONOCYTES ABSOLUTE: 0.86 E9/L (ref 0.1–0.95)
MONOCYTES RELATIVE PERCENT: 9.7 % (ref 2–12)
NEUTROPHILS ABSOLUTE: 6.38 E9/L (ref 1.8–7.3)
NEUTROPHILS RELATIVE PERCENT: 71.8 % (ref 43–80)
PDW BLD-RTO: 12.8 FL (ref 11.5–15)
PLATELET # BLD: 178 E9/L (ref 130–450)
PMV BLD AUTO: 9.9 FL (ref 7–12)
POTASSIUM SERPL-SCNC: 4.4 MMOL/L (ref 3.5–5)
RBC # BLD: 3.8 E12/L (ref 3.5–5.5)
SODIUM BLD-SCNC: 141 MMOL/L (ref 132–146)
TOTAL PROTEIN: 6.2 G/DL (ref 6.4–8.3)
WBC # BLD: 8.9 E9/L (ref 4.5–11.5)

## 2022-01-07 PROCEDURE — 85025 COMPLETE CBC W/AUTO DIFF WBC: CPT

## 2022-01-07 PROCEDURE — 80053 COMPREHEN METABOLIC PANEL: CPT

## 2022-01-07 PROCEDURE — 36415 COLL VENOUS BLD VENIPUNCTURE: CPT

## 2022-01-08 ENCOUNTER — HOSPITAL ENCOUNTER (OUTPATIENT)
Age: 87
Discharge: HOME | End: 2022-01-08

## 2022-08-08 DIAGNOSIS — M25.511 RIGHT SHOULDER PAIN, UNSPECIFIED CHRONICITY: Primary | ICD-10-CM

## 2022-08-09 ENCOUNTER — OFFICE VISIT (OUTPATIENT)
Dept: ORTHOPEDIC SURGERY | Age: 87
End: 2022-08-09
Payer: MEDICARE

## 2022-08-09 VITALS — WEIGHT: 100 LBS | HEIGHT: 62 IN | BODY MASS INDEX: 18.4 KG/M2

## 2022-08-09 DIAGNOSIS — M19.011 GLENOHUMERAL ARTHRITIS, RIGHT: Primary | ICD-10-CM

## 2022-08-09 DIAGNOSIS — M25.819 CYST OF JOINT OF SHOULDER: ICD-10-CM

## 2022-08-09 PROCEDURE — 1090F PRES/ABSN URINE INCON ASSESS: CPT | Performed by: ORTHOPAEDIC SURGERY

## 2022-08-09 PROCEDURE — 1123F ACP DISCUSS/DSCN MKR DOCD: CPT | Performed by: ORTHOPAEDIC SURGERY

## 2022-08-09 PROCEDURE — G8400 PT W/DXA NO RESULTS DOC: HCPCS | Performed by: ORTHOPAEDIC SURGERY

## 2022-08-09 PROCEDURE — G8427 DOCREV CUR MEDS BY ELIG CLIN: HCPCS | Performed by: ORTHOPAEDIC SURGERY

## 2022-08-09 PROCEDURE — 99214 OFFICE O/P EST MOD 30 MIN: CPT | Performed by: ORTHOPAEDIC SURGERY

## 2022-08-09 PROCEDURE — G8419 CALC BMI OUT NRM PARAM NOF/U: HCPCS | Performed by: ORTHOPAEDIC SURGERY

## 2022-08-09 PROCEDURE — 4004F PT TOBACCO SCREEN RCVD TLK: CPT | Performed by: ORTHOPAEDIC SURGERY

## 2022-08-09 PROCEDURE — 20610 DRAIN/INJ JOINT/BURSA W/O US: CPT | Performed by: ORTHOPAEDIC SURGERY

## 2022-08-09 PROCEDURE — 20615 TREATMENT OF BONE CYST: CPT | Performed by: ORTHOPAEDIC SURGERY

## 2022-08-09 RX ORDER — TRIAMCINOLONE ACETONIDE 40 MG/ML
40 INJECTION, SUSPENSION INTRA-ARTICULAR; INTRAMUSCULAR ONCE
Status: COMPLETED | OUTPATIENT
Start: 2022-08-09 | End: 2022-08-09

## 2022-08-09 RX ADMIN — TRIAMCINOLONE ACETONIDE 40 MG: 40 INJECTION, SUSPENSION INTRA-ARTICULAR; INTRAMUSCULAR at 11:30

## 2022-08-09 NOTE — PROGRESS NOTES
Chief Complaint   Patient presents with    Shoulder Pain     Patient here for right shoulder pain. Patient states that June 2022 she saw an Urgent Care in [facility] to have her shoulder drained d/t the amount of fluid build up. Patient states that she has limited ROM in the right arm. Patient states that she does have tingling and numbness down the arm. Windy Mccauley is a 82y.o. year old   female who is seen today  for evaluation of right shoulder pain. She reports the pain has been ongoing for the past a few years. She does not recall a specific injury which started the pain. .   She reports the pain is worse with activity, better with rest.  The patient does have mechanical symptoms. Shedoes not have night pain. She denies a feeling of instability. The prior treatments have been none. The patient   has responded to the treatment. The patient is right hand dominant. The patient is not working. The patients occupation is retired. Chief Complaint   Patient presents with    Shoulder Pain     Patient here for right shoulder pain. Patient states that June 2022 she saw an Urgent Care in [facility] to have her shoulder drained d/t the amount of fluid build up. Patient states that she has limited ROM in the right arm. Patient states that she does have tingling and numbness down the arm.      Past Medical History:   Diagnosis Date    Anxiety     Arthritis     CAD (coronary artery disease)     Cancer ([facility] [address]     skin    Colitis     Depression     GERD (gastroesophageal reflux disease)     Hyperlipidemia     Hypertension     [facility]     TIA (transient ischemic attack)     2011   NO DEFICITS     Past Surgical History:   Procedure Laterality Date    COLONOSCOPY      CORONARY ANGIOPLASTY WITH STENT PLACEMENT  2014    X3 STENTS    CORONARY ARTERY BYPASS GRAFT  2016    TRIPLE, done at [address] [place] [address] by  dr. Kary Benoit, dr. Ulises Cornejo Rosuvastatin      11/2014    Spironolactone      Bloating/Nausea    Welchol  [Colesevelam Hcl]      Tired/ Constipation    Ibuprofen Nausea And Vomiting and Other (See Comments)     Abdominal pain    Statins Rash     Rash, severe joint pain     Social History     Socioeconomic History    Marital status:      Spouse name: Not on file    Number of children: Not on file    Years of education: Not on file    Highest education level: Not on file   Occupational History    Not on file   Tobacco Use    Smoking status: Every Day     Packs/day: 0.50     Years: 60.00     Pack years: 30.00     Types: Cigarettes    Smokeless tobacco: Never   Substance and Sexual Activity    Alcohol use: Not Currently    Drug use: No    Sexual activity: Not on file   Other Topics Concern    Not on file   Social History Narrative    Not on file     Social Determinants of Health     Financial Resource Strain: Not on file   Food Insecurity: Not on file   Transportation Needs: Not on file   Physical Activity: Not on file   Stress: Not on file   Social Connections: Not on file   Intimate Partner Violence: Not on file   Housing Stability: Not on file     Family History   Problem Relation Age of Onset    High Cholesterol Sister     Hypertension Sister     Heart Surgery Brother     High Cholesterol Brother     Hypertension Brother     Liver Disease Sister        REVIEW OF SYSTEMS:     General/Constitution:  (-)weight loss, (-)fever, (-)chills, (-)weakness. Skin: (-) rash,(-) psoriasis,(-) eczema, (-)skin cancer. Musculoskeletal: (-) fractures,  (-) dislocations,(-) collagen vascular disease, (-) fibromyalgia, (-) multiple sclerosis, (-) muscular dystrophy, (-) RSD,(-) joint pain (-)swelling, (-) joint pain,swelling. Neurologic: (-) epilepsy, (-)seizures,(-) brain tumor,(-) TIA, (-)stroke, (-)headaches, (-)Parkinson disease,(-) memory loss, (-) LOC.   Cardiovascular: (-) Chest pain, (-) swelling in legs/feet, (-) SOB, (-) cramping in legs/feet with walking. Respiratory: (-) SOB, (-) Coughing, (-) night sweats. GI: (-) nausea, (-) vomiting, (-) diarrhea, (-) blood in stool, (-) gastric ulcer. Psychiatric: (-) Depression, (-) Anxiety, (-) bipolar disease, (-) Alzheimer's Disease  Allergic/Immunologic: (-) allergies latex, (-) allergies metal, (-) skin sensitivity. Hematlogic: (-) anemia, (-) blood transfusion, (-) DVT/PE, (-) Clotting disorders      Subjective:    Constitution:  Ht 5' 2\" (1.575 m)   Wt 100 lb (45.4 kg)   BMI 18.29 kg/m²     Psycihatric:  The patient is alert and oriented x 3, appears to be stated age and in no distress. Respiratory:  Respiratory effort is not labored. Patient is not gasping. Palpation of the chest reveals no tactile fremitus. Skin:  Upon inspection: the skin appears warm, dry and intact. There is not a previous scar over the affected area. There is not any cellulitis, lymphedema or cutaneous lesions noted in the lower extremities. Upon palpation there is no induration noted. Neurologic:  Motor exam of the upper extremities show: The reflexes in biceps/triceps/brachioradialis are equal and symmetric. Sensory exam C5-T1 are normal bilaterally. Cardiovascular: The vascular exam is normal and is well perfused to distal extremities. There are 2+ radial pulses bilaterally, and motor and sensation is intact to median, ulnar, and radial, musclocutaneus, and axillary nerve distribution and grossly symmetric bilaterally. There is cap refill noted less than two seconds in all digits. There is not edema of the bilateral upper extremities. There is not varicosities noted in the distal extremities. Lymph:  Upon palpation,  there is no lymphadenopathy noted in bilateral upper extremities. Musculoskeletal:  Gait: normal; examination of the nails and digits reveal no cyanosis or clubbing.       Cervical Exam:  On physical exam, Steve Foote is well-developed, well-nourished, oriented to person, place and time. her gait is normal.  On evaluation of hercervical spine, She has full range of motion of the cervical spine without pain. There is no cervical tenderness to palpation. Shoulder Exam:  On evaluation of her bilaterally upper extremities, her right shoulder has no deformity. There is tenderness upon palpation of the anterior shoulder. There is not evidence of scapular dyskinesis. There is muscle atrophy in shoulder girdle. The range of motion for the Right Shoulder is 130/40/t12 and for the Left shoulder is 150/45/t8. Right shoulder Motor strength is 5/5 in the supraspinatus, 5/5 internal rotation and 5/5 in external rotation, and Left shoulder motor strength 5/5 in supraspinatus, 5/5 in internal rotation, 5/5 in external rotation. Cyst to anterior shoulder on right side    Right shoulder:  negative Impingement , negative Whitten ,negative  Speeds,negative  Apprehension ,negative Avelar Load Shift, negative Leah manuver, negative Cross arm test.     Left shoulder:  negative Impingement , negative Whitten ,negative  Speeds,positive  Apprehension ,negative Avelar Load Shift, negative Leah manuver, negative Cross arm test.     XRAY:  severe glenohumeral arthritis    MRI:  n/a    Radiographic findings reviewed with patient    Impression:   Encounter Diagnoses   Name Primary? Glenohumeral arthritis, right Yes    Aneurysmal bone cyst of shoulder, right        Plan: Natural history and expected course discussed. Questions answered. Educational material distributed. Reduction in offending activity. Gentle ROM exercises   The skin was prepped with alcohol and betadine. A 60 ml syringe with a 18 gauge needle was inserted into the on right shoulder cyst.  I retained 15mL of fluid. I will proceed with a cortisone injection in the Right shoulder. Verbal and written consent was obtained for the injection.  Skin was prepped with alcohol, 1 ml of Kenalog 40mg and 9 ml of 0.25% Marcaine was injected into the anterior aspect into the glenohumeral joint of the Right shoulder. The patient tolerated the injections well. I will see the patient back prn. At least 30 minutes was spent discussing the diagnosis and treatment options with the patient with at least 50% of the time was spent with decision making and counseling the patient.

## 2023-01-23 ENCOUNTER — OFFICE VISIT (OUTPATIENT)
Dept: ORTHOPEDIC SURGERY | Age: 88
End: 2023-01-23
Payer: MEDICARE

## 2023-01-23 VITALS — TEMPERATURE: 98 F | BODY MASS INDEX: 18.4 KG/M2 | HEIGHT: 62 IN | WEIGHT: 100 LBS

## 2023-01-23 DIAGNOSIS — M25.819 CYST OF JOINT OF SHOULDER: ICD-10-CM

## 2023-01-23 DIAGNOSIS — M19.011 GLENOHUMERAL ARTHRITIS, RIGHT: Primary | ICD-10-CM

## 2023-01-23 DIAGNOSIS — M75.41 SHOULDER IMPINGEMENT, RIGHT: ICD-10-CM

## 2023-01-23 PROCEDURE — G8484 FLU IMMUNIZE NO ADMIN: HCPCS | Performed by: ORTHOPAEDIC SURGERY

## 2023-01-23 PROCEDURE — 99213 OFFICE O/P EST LOW 20 MIN: CPT | Performed by: ORTHOPAEDIC SURGERY

## 2023-01-23 PROCEDURE — G8427 DOCREV CUR MEDS BY ELIG CLIN: HCPCS | Performed by: ORTHOPAEDIC SURGERY

## 2023-01-23 PROCEDURE — 20610 DRAIN/INJ JOINT/BURSA W/O US: CPT | Performed by: ORTHOPAEDIC SURGERY

## 2023-01-23 PROCEDURE — 4004F PT TOBACCO SCREEN RCVD TLK: CPT | Performed by: ORTHOPAEDIC SURGERY

## 2023-01-23 PROCEDURE — G8400 PT W/DXA NO RESULTS DOC: HCPCS | Performed by: ORTHOPAEDIC SURGERY

## 2023-01-23 PROCEDURE — 1123F ACP DISCUSS/DSCN MKR DOCD: CPT | Performed by: ORTHOPAEDIC SURGERY

## 2023-01-23 PROCEDURE — G8419 CALC BMI OUT NRM PARAM NOF/U: HCPCS | Performed by: ORTHOPAEDIC SURGERY

## 2023-01-23 PROCEDURE — 1090F PRES/ABSN URINE INCON ASSESS: CPT | Performed by: ORTHOPAEDIC SURGERY

## 2023-01-23 RX ORDER — TRIAMCINOLONE ACETONIDE 40 MG/ML
40 INJECTION, SUSPENSION INTRA-ARTICULAR; INTRAMUSCULAR ONCE
Status: COMPLETED | OUTPATIENT
Start: 2023-01-23 | End: 2023-01-23

## 2023-01-23 RX ADMIN — TRIAMCINOLONE ACETONIDE 40 MG: 40 INJECTION, SUSPENSION INTRA-ARTICULAR; INTRAMUSCULAR at 11:20

## 2023-01-23 NOTE — PROGRESS NOTES
Chief Complaint   Patient presents with    Shoulder Pain     Right Shoulder F/U        Trisha Nieto is a 82y.o. year old   female who is seen today  for evaluation of right shoulder pain. She reports the pain has been ongoing for the past a few years. She does not recall a specific injury which started the pain. .   She reports the pain is worse with activity, better with rest.  The patient does have mechanical symptoms. Shedoes not have night pain. She denies a feeling of instability. The prior treatments have been aspiration and drainage. The patient   has responded to the treatment. The patient is right hand dominant. The patient is not working. The patients occupation is retired.        Chief Complaint   Patient presents with    Shoulder Pain     Right Shoulder F/U     Past Medical History:   Diagnosis Date    Anxiety     Arthritis     CAD (coronary artery disease)     Cancer ([facility] [address]     skin    Colitis     Depression     GERD (gastroesophageal reflux disease)     Hyperlipidemia     Hypertension     PVD (peripheral vascular disease) ([facility] [address]     TIA (transient ischemic attack)     2011   NO DEFICITS     Past Surgical History:   Procedure Laterality Date    COLONOSCOPY      CORONARY ANGIOPLASTY WITH STENT PLACEMENT  2014    X3 STENTS    CORONARY ARTERY BYPASS GRAFT  2016    TRIPLE, done at [address] [place] [address] by  dr. Srinivas Dale, dr. Griselda [facility] CATH LAB PROCEDURE      EYE SURGERY Bilateral     cataracts    HYSTERECTOMY      KNEE ARTHROSCOPY Left 12/11/2019    LEFT KNEE ARTHROSCOPY, MEDIAL and LATERAL MENISCECTOMY AND CHONDROPLASTY performed by Gilda Neri DO at [address]    Aortic femoral Y graft  dr Leyda Martins      renal stents x2    TONSILLECTOMY         Current Outpatient Medications:     budesonide (ENTOCORT EC) 3 MG extended release capsule, , Disp: , Rfl: 0    metoprolol tartrate (LOPRESSOR) 25 MG tablet, Take 25 mg by mouth 2 times daily Takes 1/2 tab, Disp: , Rfl:     isosorbide mononitrate (IMDUR) 30 MG extended release tablet, Take 30 mg by mouth 2 times daily , Disp: , Rfl:     potassium chloride (KLOR-CON M) 20 MEQ extended release tablet, Take 20 mEq by mouth daily Takes 2 tabs every other day alternating with 1 tab, Disp: , Rfl:     sertraline (ZOLOFT) 50 MG tablet, take 1 tablet by mouth once daily for MOOD, Disp: , Rfl: 0    Evolocumab 140 MG/ML SOSY, Inject 140 mg into the skin EVERY OTHER WEEK, Disp: , Rfl:     losartan (COZAAR) 100 MG tablet, Take 100 mg by mouth daily, Disp: , Rfl:     Multiple Vitamins-Minerals (THERAPEUTIC MULTIVITAMIN-MINERALS) tablet, Take 1 tablet by mouth daily, Disp: , Rfl:     amLODIPine (NORVASC) 10 MG tablet, Take 10 mg by mouth daily, Disp: , Rfl:     LORazepam (ATIVAN) 1 MG tablet, Take 1 mg by mouth 2 times daily as needed for Anxiety. Takes 1/2 tab, Disp: , Rfl:     aspirin EC 81 MG EC tablet, Take 1 tablet by mouth daily for 28 days, Disp: 56 tablet, Rfl: 0  Allergies   Allergen Reactions    Atorvastatin      Rash    Clonidine      Dry Mouth, Tongue Sores, Bad Mood--6/2013    Ezetimibe      Fatigue    Fenofibrate      Dizzy Tired Confusion    Lisinopril-Hydrochlorothiazide      Headaches/ Eye Swells    Lovastatin      Diarrhea    Metronidazole      lips swollen    Niacin     Pravachol  [Pravastatin Sodium]      Rash/ Arms And Hands Swelling    Rosuvastatin      11/2014    Spironolactone      Bloating/Nausea    Welchol  [Colesevelam Hcl]      Tired/ Constipation    Ibuprofen Nausea And Vomiting and Other (See Comments)     Abdominal pain    Statins Rash     Rash, severe joint pain     Social History     Socioeconomic History    Marital status:       Spouse name: Not on file    Number of children: Not on file    Years of education: Not on file    Highest education level: Not on file   Occupational History    Not on file   Tobacco Use    Smoking status: Every Day Packs/day: 0.50     Years: 60.00     Pack years: 30.00     Types: Cigarettes    Smokeless tobacco: Never   Substance and Sexual Activity    Alcohol use: Not Currently    Drug use: No    Sexual activity: Not on file   Other Topics Concern    Not on file   Social History Narrative    Not on file     Social Determinants of Health     Financial Resource Strain: Not on file   Food Insecurity: Not on file   Transportation Needs: Not on file   Physical Activity: Not on file   Stress: Not on file   Social Connections: Not on file   Intimate Partner Violence: Not on file   Housing Stability: Not on file     Family History   Problem Relation Age of Onset    High Cholesterol Sister     Hypertension Sister     Heart Surgery Brother     High Cholesterol Brother     Hypertension Brother     Liver Disease Sister        REVIEW OF SYSTEMS:     General/Constitution:  (-)weight loss, (-)fever, (-)chills, (-)weakness. Skin: (-) rash,(-) psoriasis,(-) eczema, (-)skin cancer. Musculoskeletal: (-) fractures,  (-) dislocations,(-) collagen vascular disease, (-) fibromyalgia, (-) multiple sclerosis, (-) muscular dystrophy, (-) RSD,(-) joint pain (-)swelling, (-) joint pain,swelling. Neurologic: (-) epilepsy, (-)seizures,(-) brain tumor,(-) TIA, (-)stroke, (-)headaches, (-)Parkinson disease,(-) memory loss, (-) LOC. Cardiovascular: (-) Chest pain, (-) swelling in legs/feet, (-) SOB, (-) cramping in legs/feet with walking. Respiratory: (-) SOB, (-) Coughing, (-) night sweats. GI: (-) nausea, (-) vomiting, (-) diarrhea, (-) blood in stool, (-) gastric ulcer. Psychiatric: (-) Depression, (-) Anxiety, (-) bipolar disease, (-) Alzheimer's Disease  Allergic/Immunologic: (-) allergies latex, (-) allergies metal, (-) skin sensitivity.   Hematlogic: (-) anemia, (-) blood transfusion, (-) DVT/PE, (-) Clotting disorders      Subjective:    Constitution:  Temp 98 °F (36.7 °C)   Ht 5' 2\" (1.575 m)   Wt 100 lb (45.4 kg)   BMI 18.29 kg/m² Psycihatric:  The patient is alert and oriented x 3, appears to be stated age and in no distress. Respiratory:  Respiratory effort is not labored. Patient is not gasping. Palpation of the chest reveals no tactile fremitus. Skin:  Upon inspection: the skin appears warm, dry and intact. There is not a previous scar over the affected area. There is not any cellulitis, lymphedema or cutaneous lesions noted in the lower extremities. Upon palpation there is no induration noted. Neurologic:  Motor exam of the upper extremities show: The reflexes in biceps/triceps/brachioradialis are equal and symmetric. Sensory exam C5-T1 are normal bilaterally. Cardiovascular: The vascular exam is normal and is well perfused to distal extremities. There are 2+ radial pulses bilaterally, and motor and sensation is intact to median, ulnar, and radial, musclocutaneus, and axillary nerve distribution and grossly symmetric bilaterally. There is cap refill noted less than two seconds in all digits. There is not edema of the bilateral upper extremities. There is not varicosities noted in the distal extremities. Lymph:  Upon palpation,  there is no lymphadenopathy noted in bilateral upper extremities. Musculoskeletal:  Gait: normal; examination of the nails and digits reveal no cyanosis or clubbing. Cervical Exam:  On physical exam, Vamshi Jin is well-developed, well-nourished, oriented to person, place and time. her gait is normal.  On evaluation of hercervical spine, She has full range of motion of the cervical spine without pain. There is no cervical tenderness to palpation. Shoulder Exam:  On evaluation of her bilaterally upper extremities, her right shoulder has no deformity. There is tenderness upon palpation of the anterior shoulder. There is not evidence of scapular dyskinesis. There is muscle atrophy in shoulder girdle.  The range of motion for the Right Shoulder is 130/40/t12 and for the Left shoulder is 150/45/t8. Right shoulder Motor strength is 5/5 in the supraspinatus, 5/5 internal rotation and 5/5 in external rotation, and Left shoulder motor strength 5/5 in supraspinatus, 5/5 in internal rotation, 5/5 in external rotation. Cyst to anterior shoulder on right side    Right shoulder:  negative Impingement , negative Whitten ,negative  Speeds,negative  Apprehension ,negative Avelar Load Shift, negative Leah manuver, negative Cross arm test.     Left shoulder:  negative Impingement , negative Whitten ,negative  Speeds,positive  Apprehension ,negative Avelar Load Shift, negative Leah manuver, negative Cross arm test.     XRAY:    FINDINGS:   Glenohumeral joint is normally aligned. No evidence of acute fracture or   dislocation. No abnormal periarticular calcifications. The [facility] joint is   unremarkable in appearance. Glenohumeral joint degenerative changes. MRI:  n/a    Radiographic findings reviewed with patient    Impression:   Encounter Diagnoses   Name Primary? Glenohumeral arthritis, right Yes    Cyst of joint of shoulder     Shoulder impingement, right        Plan: Natural history and expected course discussed. Questions answered. Educational material distributed. Reduction in offending activity. Gentle ROM exercises   The skin was prepped with alcohol and betadine. A 60 ml syringe with a 18 gauge needle was inserted into the on right shoulder subacromial space. I retained 15mL of fluid. I will proceed with a cortisone injection in the Right shoulder. Verbal and written consent was obtained for the injection. Skin was prepped with alcohol, 1 ml of Kenalog 40mg and 9 ml of 0.25% Marcaine was injected into the positioner  aspect into the subacromial  joint of the Right shoulder. The patient tolerated the injections well. I will see the patient back prn.   At least 30 minutes was spent discussing the diagnosis and treatment options with the patient with at least 50% of the time was spent with decision making and counseling the patient.

## 2023-02-14 ENCOUNTER — OFFICE VISIT (OUTPATIENT)
Dept: ORTHOPEDIC SURGERY | Age: 88
End: 2023-02-14
Payer: MEDICARE

## 2023-02-14 VITALS — HEIGHT: 62 IN | WEIGHT: 100 LBS | BODY MASS INDEX: 18.4 KG/M2 | TEMPERATURE: 98 F

## 2023-02-14 DIAGNOSIS — M19.011 GLENOHUMERAL ARTHRITIS, RIGHT: Primary | ICD-10-CM

## 2023-02-14 DIAGNOSIS — M25.819 CYST OF JOINT OF SHOULDER: ICD-10-CM

## 2023-02-14 PROCEDURE — 99213 OFFICE O/P EST LOW 20 MIN: CPT | Performed by: ORTHOPAEDIC SURGERY

## 2023-02-14 PROCEDURE — G8427 DOCREV CUR MEDS BY ELIG CLIN: HCPCS | Performed by: ORTHOPAEDIC SURGERY

## 2023-02-14 PROCEDURE — G8419 CALC BMI OUT NRM PARAM NOF/U: HCPCS | Performed by: ORTHOPAEDIC SURGERY

## 2023-02-14 PROCEDURE — 4004F PT TOBACCO SCREEN RCVD TLK: CPT | Performed by: ORTHOPAEDIC SURGERY

## 2023-02-14 PROCEDURE — 1123F ACP DISCUSS/DSCN MKR DOCD: CPT | Performed by: ORTHOPAEDIC SURGERY

## 2023-02-14 PROCEDURE — 1090F PRES/ABSN URINE INCON ASSESS: CPT | Performed by: ORTHOPAEDIC SURGERY

## 2023-02-14 PROCEDURE — G8400 PT W/DXA NO RESULTS DOC: HCPCS | Performed by: ORTHOPAEDIC SURGERY

## 2023-02-14 PROCEDURE — G8484 FLU IMMUNIZE NO ADMIN: HCPCS | Performed by: ORTHOPAEDIC SURGERY

## 2023-02-14 PROCEDURE — 20610 DRAIN/INJ JOINT/BURSA W/O US: CPT | Performed by: ORTHOPAEDIC SURGERY

## 2023-02-14 RX ORDER — TRIAMCINOLONE ACETONIDE 40 MG/ML
40 INJECTION, SUSPENSION INTRA-ARTICULAR; INTRAMUSCULAR ONCE
Status: COMPLETED | OUTPATIENT
Start: 2023-02-14 | End: 2023-02-14

## 2023-02-14 RX ADMIN — TRIAMCINOLONE ACETONIDE 40 MG: 40 INJECTION, SUSPENSION INTRA-ARTICULAR; INTRAMUSCULAR at 10:19

## 2023-02-14 NOTE — PROGRESS NOTES
Chief Complaint   Patient presents with    Shoulder Pain     Right shoulder pain follow up. Shoulder feels worse then last visit. Had no relief from cortisone injection. Shoulder now hurts constantly and unable to hold coffee mug. Lalita Dejesus is a 82y.o. year old   female who is seen today  for evaluation of right shoulder pain. She reports the pain has been ongoing for the past a few years. She does not recall a specific injury which started the pain. .   She reports the pain is worse with activity, better with rest.  The patient does have mechanical symptoms. Shedoes not have night pain. She denies a feeling of instability. The prior treatments have been aspiration and drainage. The patient   has responded to the treatment. The patient is right hand dominant. The patient is not working. The patients occupation is retired. Chief Complaint   Patient presents with    Shoulder Pain     Right shoulder pain follow up. Shoulder feels worse then last visit. Had no relief from cortisone injection. Shoulder now hurts constantly and unable to hold coffee mug.       Past Medical History:   Diagnosis Date    Anxiety     Arthritis     CAD (coronary artery disease)     Cancer ([facility] [address]     skin    Colitis     Depression     GERD (gastroesophageal reflux disease)     Hyperlipidemia     Hypertension     PVD (peripheral vascular disease) ([facility] [address]     TIA (transient ischemic attack)     2011   NO DEFICITS     Past Surgical History:   Procedure Laterality Date    COLONOSCOPY      CORONARY ANGIOPLASTY WITH STENT PLACEMENT  2014    X3 STENTS    CORONARY ARTERY BYPASS GRAFT  2016    TRIPLE, done at [address] [place] [address] by  dr. Javier Judge, dr. Yamilka Dunbar Bilateral     cataracts    HYSTERECTOMY (CERVIX STATUS UNKNOWN)      KNEE ARTHROSCOPY Left 12/11/2019    LEFT KNEE ARTHROSCOPY, MEDIAL and LATERAL MENISCECTOMY AND CHONDROPLASTY performed by Milo Eagle DO at [address]    Aortic femoral Y graft  dr Sandra Willett HISTORY      renal stents x2    TONSILLECTOMY         Current Outpatient Medications:     budesonide (ENTOCORT EC) 3 MG extended release capsule, , Disp: , Rfl: 0    aspirin EC 81 MG EC tablet, Take 1 tablet by mouth daily for 28 days, Disp: 56 tablet, Rfl: 0    metoprolol tartrate (LOPRESSOR) 25 MG tablet, Take 25 mg by mouth 2 times daily Takes 1/2 tab, Disp: , Rfl:     isosorbide mononitrate (IMDUR) 30 MG extended release tablet, Take 30 mg by mouth 2 times daily , Disp: , Rfl:     potassium chloride (KLOR-CON M) 20 MEQ extended release tablet, Take 20 mEq by mouth daily Takes 2 tabs every other day alternating with 1 tab, Disp: , Rfl:     sertraline (ZOLOFT) 50 MG tablet, take 1 tablet by mouth once daily for MOOD, Disp: , Rfl: 0    Evolocumab 140 MG/ML SOSY, Inject 140 mg into the skin EVERY OTHER WEEK, Disp: , Rfl:     losartan (COZAAR) 100 MG tablet, Take 100 mg by mouth daily, Disp: , Rfl:     Multiple Vitamins-Minerals (THERAPEUTIC MULTIVITAMIN-MINERALS) tablet, Take 1 tablet by mouth daily, Disp: , Rfl:     amLODIPine (NORVASC) 10 MG tablet, Take 10 mg by mouth daily, Disp: , Rfl:     LORazepam (ATIVAN) 1 MG tablet, Take 1 mg by mouth 2 times daily as needed for Anxiety.  Takes 1/2 tab, Disp: , Rfl:   Allergies   Allergen Reactions    Atorvastatin      Rash    Clonidine      Dry Mouth, Tongue Sores, Bad Mood--6/2013    Ezetimibe      Fatigue    Fenofibrate      Dizzy Tired Confusion    Lisinopril-Hydrochlorothiazide      Headaches/ Eye Swells    Lovastatin      Diarrhea    Metronidazole      lips swollen    Niacin     Pravachol  [Pravastatin Sodium]      Rash/ Arms And Hands Swelling    Rosuvastatin      11/2014    Spironolactone      Bloating/Nausea    Welchol  [Colesevelam Hcl]      Tired/ Constipation    Ibuprofen Nausea And Vomiting and Other (See Comments)     Abdominal pain    Statins Rash Rash, severe joint pain     Social History     Socioeconomic History    Marital status:      Spouse name: Not on file    Number of children: Not on file    Years of education: Not on file    Highest education level: Not on file   Occupational History    Not on file   Tobacco Use    Smoking status: Every Day     Packs/day: 0.50     Years: 60.00     Pack years: 30.00     Types: Cigarettes    Smokeless tobacco: Never   Substance and Sexual Activity    Alcohol use: Not Currently    Drug use: No    Sexual activity: Not on file   Other Topics Concern    Not on file   Social History Narrative    Not on file     Social Determinants of Health     Financial Resource Strain: Not on file   Food Insecurity: Not on file   Transportation Needs: Not on file   Physical Activity: Not on file   Stress: Not on file   Social Connections: Not on file   Intimate Partner Violence: Not on file   Housing Stability: Not on file     Family History   Problem Relation Age of Onset    High Cholesterol Sister     Hypertension Sister     Heart Surgery Brother     High Cholesterol Brother     Hypertension Brother     Liver Disease Sister        REVIEW OF SYSTEMS:     General/Constitution:  (-)weight loss, (-)fever, (-)chills, (-)weakness. Skin: (-) rash,(-) psoriasis,(-) eczema, (-)skin cancer. Musculoskeletal: (-) fractures,  (-) dislocations,(-) collagen vascular disease, (-) fibromyalgia, (-) multiple sclerosis, (-) muscular dystrophy, (-) RSD,(-) joint pain (-)swelling, (-) joint pain,swelling. Neurologic: (-) epilepsy, (-)seizures,(-) brain tumor,(-) TIA, (-)stroke, (-)headaches, (-)Parkinson disease,(-) memory loss, (-) LOC. Cardiovascular: (-) Chest pain, (-) swelling in legs/feet, (-) SOB, (-) cramping in legs/feet with walking. Respiratory: (-) SOB, (-) Coughing, (-) night sweats. GI: (-) nausea, (-) vomiting, (-) diarrhea, (-) blood in stool, (-) gastric ulcer.   Psychiatric: (-) Depression, (-) Anxiety, (-) bipolar disease, (-) Alzheimer's Disease  Allergic/Immunologic: (-) allergies latex, (-) allergies metal, (-) skin sensitivity. Hematlogic: (-) anemia, (-) blood transfusion, (-) DVT/PE, (-) Clotting disorders      Subjective:    Constitution:  Temp 98 °F (36.7 °C)   Ht 5' 2\" (1.575 m)   Wt 100 lb (45.4 kg)   BMI 18.29 kg/m²     Psycihatric:  The patient is alert and oriented x 3, appears to be stated age and in no distress. Respiratory:  Respiratory effort is not labored. Patient is not gasping. Palpation of the chest reveals no tactile fremitus. Skin:  Upon inspection: the skin appears warm, dry and intact. There is not a previous scar over the affected area. There is not any cellulitis, lymphedema or cutaneous lesions noted in the lower extremities. Upon palpation there is no induration noted. Neurologic:  Motor exam of the upper extremities show: The reflexes in biceps/triceps/brachioradialis are equal and symmetric. Sensory exam C5-T1 are normal bilaterally. Cardiovascular: The vascular exam is normal and is well perfused to distal extremities. There are 2+ radial pulses bilaterally, and motor and sensation is intact to median, ulnar, and radial, musclocutaneus, and axillary nerve distribution and grossly symmetric bilaterally. There is cap refill noted less than two seconds in all digits. There is not edema of the bilateral upper extremities. There is not varicosities noted in the distal extremities. Lymph:  Upon palpation,  there is no lymphadenopathy noted in bilateral upper extremities. Musculoskeletal:  Gait: normal; examination of the nails and digits reveal no cyanosis or clubbing. Cervical Exam:  On physical exam, Sanjay Daugherty is well-developed, well-nourished, oriented to person, place and time. her gait is normal.  On evaluation of hercervical spine, She has full range of motion of the cervical spine without pain. There is no cervical tenderness to palpation.     Shoulder Exam:  On evaluation of her bilaterally upper extremities, her right shoulder has no deformity.  There is tenderness upon palpation of the anterior shoulder.  There is not evidence of scapular dyskinesis.  There is muscle atrophy in shoulder girdle. The range of motion for the Right Shoulder is 130/40/t12 and for the Left shoulder is 150/45/t8.  Right shoulder Motor strength is 5/5 in the supraspinatus, 5/5 internal rotation and 5/5 in external rotation, and Left shoulder motor strength 5/5 in supraspinatus, 5/5 in internal rotation, 5/5 in external rotation.    Cyst to anterior shoulder on right side    Right shoulder:  negative Impingement , negative Whitten ,negative  Speeds,negative  Apprehension ,negative Avelar Load Shift, negative Leah manuver, negative Cross arm test.     Left shoulder:  negative Impingement , negative Whitten ,negative  Speeds,positive  Apprehension ,negative Avelar Load Shift, negative Leah manuver, negative Cross arm test.     XRAY:    FINDINGS:   Glenohumeral joint is normally aligned.  No evidence of acute fracture or   dislocation.  No abnormal periarticular calcifications.  The AC joint is   unremarkable in appearance.  Glenohumeral joint degenerative changes.       MRI:  n/a    Radiographic findings reviewed with patient    Impression:   Encounter Diagnoses   Name Primary?    Glenohumeral arthritis, right Yes    Cyst of joint of shoulder          Plan: Natural history and expected course discussed. Questions answered.  Educational material distributed.  Reduction in offending activity.  Gentle ROM exercises   The skin was prepped with alcohol and betadine. A 60 ml syringe with a 18 gauge needle was inserted into the on right shoulder subacromial space.  I retained 20 mL of fluid.   I will proceed with a cortisone injection in the Right shoulder.  Verbal and written consent was obtained for the injection. Skin was prepped with alcohol, 1 ml of Kenalog 40mg and 9  ml of 0.25% Marcaine was injected into the ANTERIOR aspect into the [facility] joint of the Right shoulder. The patient tolerated the injections well. I will see the patient back prn. At least 30 minutes was spent discussing the diagnosis and treatment options with the patient with at least 50% of the time was spent with decision making and counseling the patient.

## 2023-03-07 ENCOUNTER — TELEPHONE (OUTPATIENT)
Dept: ADMINISTRATIVE | Age: 88
End: 2023-03-07

## 2023-03-07 NOTE — TELEPHONE ENCOUNTER
Pt's son in law Skip called to see if they can get pt in for a Gel injection for her Right Shoulder pain. He stated the last time she was seen  had discussed the Gel injection. He stated that the Cortisone injection is not working. Please contact Yazmin's daughter Tashi Cantu.

## 2023-03-16 ENCOUNTER — OFFICE VISIT (OUTPATIENT)
Dept: ORTHOPEDIC SURGERY | Age: 88
End: 2023-03-16

## 2023-03-16 DIAGNOSIS — M19.011 GLENOHUMERAL ARTHRITIS, RIGHT: Primary | ICD-10-CM

## 2023-03-22 RX ORDER — HYALURONATE SODIUM 10 MG/ML
20 SYRINGE (ML) INTRAARTICULAR ONCE
Status: COMPLETED | OUTPATIENT
Start: 2023-03-22 | End: 2023-03-22

## 2023-03-22 RX ADMIN — Medication 20 MG: at 15:39

## 2023-03-22 NOTE — PROGRESS NOTES
Chief Complaint   Patient presents with    Shoulder Pain     Right shoulder Euflexxa #1       Verbal and written consent was obtained by the patient. The following is a well known to me that is here for Euflexxa # 1. The knee was prepped in sterile fashion. Euflexxa 20 mg injected to Right shoulder The patient tolerated the injections well and I will see the patient back in 1 week for repeat injections. Alina Hidalgo was seen today for shoulder pain.     Diagnoses and all orders for this visit:    Glenohumeral arthritis, right  -     LA ARTHROCENTESIS ASPIR&/INJ MAJOR JT/BURSA W/O US  -     sodium hyaluronate (EUFLEXXA, HYALGAN) injection 20 mg

## 2023-03-23 ENCOUNTER — OFFICE VISIT (OUTPATIENT)
Dept: ORTHOPEDIC SURGERY | Age: 88
End: 2023-03-23

## 2023-03-23 DIAGNOSIS — M19.011 GLENOHUMERAL ARTHRITIS, RIGHT: Primary | ICD-10-CM

## 2023-03-29 RX ORDER — HYALURONATE SODIUM 10 MG/ML
20 SYRINGE (ML) INTRAARTICULAR ONCE
Status: COMPLETED | OUTPATIENT
Start: 2023-03-29 | End: 2023-03-29

## 2023-03-29 RX ADMIN — Medication 20 MG: at 13:08

## 2023-03-29 NOTE — PROGRESS NOTES
Chief Complaint   Patient presents with    Injections     Right shoulder Euflexxa #2       Verbal and written consent was obtained by the patient. The following is a well known to me that is here for Euflexxa # 1. The knee was prepped in sterile fashion. Euflexxa 20 mg injected to Right shoulder The patient tolerated the injections well and I will see the patient back in 1 week for repeat injections. Marilynn Crocker was seen today for injections.     Diagnoses and all orders for this visit:    Glenohumeral arthritis, right  -     IL ARTHROCENTESIS ASPIR&/INJ MAJOR JT/BURSA W/O US  -     sodium hyaluronate (EUFLEXXA, HYALGAN) injection 20 mg

## 2023-03-30 ENCOUNTER — OFFICE VISIT (OUTPATIENT)
Dept: ORTHOPEDIC SURGERY | Age: 88
End: 2023-03-30

## 2023-03-30 DIAGNOSIS — M19.011 GLENOHUMERAL ARTHRITIS, RIGHT: Primary | ICD-10-CM

## 2023-03-31 RX ORDER — HYALURONATE SODIUM 10 MG/ML
20 SYRINGE (ML) INTRAARTICULAR ONCE
Status: COMPLETED | OUTPATIENT
Start: 2023-03-31 | End: 2023-03-31

## 2023-03-31 RX ADMIN — Medication 20 MG: at 12:38

## 2023-03-31 NOTE — PROGRESS NOTES
.  Chief Complaint   Patient presents with    Injections     Right Shoulder Euflexxa #3 Injection       Verbal and written consent was obtained by the patient. The following is a well known to me that is here for Euflexxa # 3. The Shoulder was prepped in sterile fashion. Euflexxa 20 mg injected to Right Shoulder. The patient tolerated the injections well and I will see the patient back in 1 week for repeat injections. Erik Naik was seen today for injections.     Diagnoses and all orders for this visit:    Glenohumeral arthritis, right  -     MA ARTHROCENTESIS ASPIR&/INJ MAJOR JT/BURSA W/O US  -     sodium hyaluronate (EUFLEXXA, HYALGAN) injection 20 mg

## 2023-04-03 ENCOUNTER — TELEPHONE (OUTPATIENT)
Dept: ORTHOPEDIC SURGERY | Age: 88
End: 2023-04-03

## 2023-04-03 DIAGNOSIS — S46.011A TRAUMATIC COMPLETE TEAR OF RIGHT ROTATOR CUFF, INITIAL ENCOUNTER: Primary | ICD-10-CM

## 2023-04-24 ENCOUNTER — OFFICE VISIT (OUTPATIENT)
Dept: ORTHOPEDIC SURGERY | Age: 88
End: 2023-04-24
Payer: MEDICARE

## 2023-04-24 VITALS — WEIGHT: 100 LBS | BODY MASS INDEX: 18.4 KG/M2 | HEIGHT: 62 IN

## 2023-04-24 DIAGNOSIS — M19.011 GLENOHUMERAL ARTHRITIS, RIGHT: Primary | ICD-10-CM

## 2023-04-24 PROCEDURE — 1090F PRES/ABSN URINE INCON ASSESS: CPT | Performed by: ORTHOPAEDIC SURGERY

## 2023-04-24 PROCEDURE — 99213 OFFICE O/P EST LOW 20 MIN: CPT | Performed by: ORTHOPAEDIC SURGERY

## 2023-04-24 PROCEDURE — G8400 PT W/DXA NO RESULTS DOC: HCPCS | Performed by: ORTHOPAEDIC SURGERY

## 2023-04-24 PROCEDURE — 1123F ACP DISCUSS/DSCN MKR DOCD: CPT | Performed by: ORTHOPAEDIC SURGERY

## 2023-04-24 PROCEDURE — G8419 CALC BMI OUT NRM PARAM NOF/U: HCPCS | Performed by: ORTHOPAEDIC SURGERY

## 2023-04-24 PROCEDURE — G8427 DOCREV CUR MEDS BY ELIG CLIN: HCPCS | Performed by: ORTHOPAEDIC SURGERY

## 2023-04-24 PROCEDURE — 4004F PT TOBACCO SCREEN RCVD TLK: CPT | Performed by: ORTHOPAEDIC SURGERY

## 2023-05-01 NOTE — PROGRESS NOTES
Chief Complaint   Patient presents with    Shoulder Pain     Rt shoulder cyst has gotten bigger but the gel shot helped her bones. MRI completed. Ruben Hopkins is a 82y.o. year old female that presents today for right shoulder pain follow up. She recently underwent an MRI and is here to discuss the results. Chief Complaint   Patient presents with    Shoulder Pain     Rt shoulder cyst has gotten bigger but the gel shot helped her bones. MRI completed.      Past Medical History:   Diagnosis Date    Anxiety     Arthritis     CAD (coronary artery disease)     Cancer ([facility] [address]     skin    Colitis     Depression     GERD (gastroesophageal reflux disease)     Hyperlipidemia     Hypertension     PVD (peripheral vascular disease) ([facility] [address]     TIA (transient ischemic attack)     2011   NO DEFICITS     Past Surgical History:   Procedure Laterality Date    COLONOSCOPY      CORONARY ANGIOPLASTY WITH STENT PLACEMENT  2014    X3 STENTS    CORONARY ARTERY BYPASS GRAFT  2016    TRIPLE, done at [address] [place] [address] by  dr. Brando Spears, dr. Gonzalo Wellington CATH LAB PROCEDURE      EYE SURGERY Bilateral     cataracts    HYSTERECTOMY (CERVIX STATUS UNKNOWN)      KNEE ARTHROSCOPY Left 12/11/2019    LEFT KNEE ARTHROSCOPY, MEDIAL and LATERAL MENISCECTOMY AND CHONDROPLASTY performed by Ismael Castillo DO at [address]    Aortic femoral Y graft  dr Anna Marie Vidales      renal stents x2    TONSILLECTOMY         Current Outpatient Medications:     budesonide (ENTOCORT EC) 3 MG extended release capsule, , Disp: , Rfl: 0    metoprolol tartrate (LOPRESSOR) 25 MG tablet, Take 1 tablet by mouth 2 times daily Takes 1/2 tab, Disp: , Rfl:     isosorbide mononitrate (IMDUR) 30 MG extended release tablet, Take 1 tablet by mouth 2 times daily, Disp: , Rfl:     potassium chloride (KLOR-CON M) 20 MEQ extended release tablet, Take 1 tablet by mouth daily Takes 2 tabs every other day

## 2023-07-26 DIAGNOSIS — M25.552 LEFT HIP PAIN: Primary | ICD-10-CM

## 2023-07-31 ENCOUNTER — OFFICE VISIT (OUTPATIENT)
Dept: ORTHOPEDIC SURGERY | Age: 88
End: 2023-07-31

## 2023-07-31 VITALS — HEIGHT: 62 IN | TEMPERATURE: 98 F | WEIGHT: 100 LBS | BODY MASS INDEX: 18.4 KG/M2

## 2023-07-31 DIAGNOSIS — M70.62 GREATER TROCHANTERIC BURSITIS OF LEFT HIP: Primary | ICD-10-CM

## 2023-08-08 ENCOUNTER — TELEPHONE (OUTPATIENT)
Dept: ORTHOPEDIC SURGERY | Age: 88
End: 2023-08-08

## 2023-08-08 DIAGNOSIS — M54.89 OTHER BACK PAIN, UNSPECIFIED CHRONICITY: Primary | ICD-10-CM

## 2023-08-08 RX ORDER — TRIAMCINOLONE ACETONIDE 40 MG/ML
40 INJECTION, SUSPENSION INTRA-ARTICULAR; INTRAMUSCULAR ONCE
Status: COMPLETED | OUTPATIENT
Start: 2023-08-08 | End: 2023-08-08

## 2023-08-08 RX ADMIN — TRIAMCINOLONE ACETONIDE 40 MG: 40 INJECTION, SUSPENSION INTRA-ARTICULAR; INTRAMUSCULAR at 21:09

## 2023-08-08 NOTE — TELEPHONE ENCOUNTER
Patient son in law is asking for a referral to someone to give patient injection in her back. He said Dr Meet Peoples advised he can refer them to someone that can do this but he cannot remember the name. Possibly PMR. Please contact patient if referral can be placed.  Thank you

## 2023-09-07 ENCOUNTER — OFFICE VISIT (OUTPATIENT)
Dept: PHYSICAL MEDICINE AND REHAB | Age: 88
End: 2023-09-07

## 2023-09-07 VITALS
HEART RATE: 60 BPM | DIASTOLIC BLOOD PRESSURE: 54 MMHG | HEIGHT: 62 IN | WEIGHT: 96.1 LBS | BODY MASS INDEX: 17.68 KG/M2 | SYSTOLIC BLOOD PRESSURE: 120 MMHG

## 2023-09-07 DIAGNOSIS — M47.816 FACET ARTHROPATHY, LUMBAR: ICD-10-CM

## 2023-09-07 DIAGNOSIS — M47.818 ARTHRITIS OF SACROILIAC JOINT OF BOTH SIDES: Primary | ICD-10-CM

## 2023-09-07 DIAGNOSIS — M79.606 PAIN OF LOWER EXTREMITY, UNSPECIFIED LATERALITY: ICD-10-CM

## 2023-09-07 RX ORDER — LIDOCAINE HYDROCHLORIDE 10 MG/ML
4 INJECTION, SOLUTION INFILTRATION; PERINEURAL ONCE
Status: COMPLETED | OUTPATIENT
Start: 2023-09-07 | End: 2023-09-07

## 2023-09-07 RX ORDER — TRAMADOL HYDROCHLORIDE 50 MG/1
50 TABLET ORAL DAILY PRN
Qty: 20 TABLET | Refills: 0 | Status: SHIPPED | OUTPATIENT
Start: 2023-09-07 | End: 2024-01-05

## 2023-09-07 RX ORDER — TRIAMCINOLONE ACETONIDE 40 MG/ML
40 INJECTION, SUSPENSION INTRA-ARTICULAR; INTRAMUSCULAR ONCE
Status: COMPLETED | OUTPATIENT
Start: 2023-09-07 | End: 2023-09-07

## 2023-09-07 RX ADMIN — LIDOCAINE HYDROCHLORIDE 4 ML: 10 INJECTION, SOLUTION INFILTRATION; PERINEURAL at 11:54

## 2023-09-07 RX ADMIN — TRIAMCINOLONE ACETONIDE 40 MG: 40 INJECTION, SUSPENSION INTRA-ARTICULAR; INTRAMUSCULAR at 11:55

## 2023-09-07 NOTE — PROGRESS NOTES
sacroiliac joint injection, the patient agreed to proceed. A permit was signed and scanned into the chart. The patient was placed in the prone position and draped for modesty. The skin on the Left upper buttock was prepared with Chloraprep. Using aseptic no touch technique, a 22 gauge, 3\" needle with 1 cc of Kenalog 40mg/cc and 1 cc of 1% lidocaine was directed to the joint using ultrasound guidance. After negative aspiration, the medication was injected. Adequate hemostasis was achieved and a bandage applied. The patient tolerated the procedure well and was educated in post injection care. The patient was clinically monitored after the injection and left the office without incident. There was post injection reduction in pain. Ultrasound images are scanned in the electronic medical record separately. Nithya Alex D.O., P.T.   Board Certified Physical Medicine and Rehabilitation  Board Certified Electrodiagnostic Medicine    Administrations This Visit       lidocaine 1 % injection 4 mL       Admin Date  09/07/2023  11:54 Action  Given Dose  4 mL Route  Other Site   Administered By  Bran Fitzgerald MA    Ordering Provider: Moustapha Her DO    NDC: 0571-5158-10    Lot#: [id number]    [id number]: [facility]    Patient Supplied?: No              triamcinolone acetonide (KENALOG-40) injection 40 mg       Admin Date  09/07/2023  11:55 Action  Given Dose  40 mg Route  Intra-artICUlar Site   Administered By  Bran Fitzgerald MA    Ordering Provider: Moustapha Her DO    NDC: 3181-2265-93    Lot#: [id number]    : [employer] (PRIMARY CARE)    Patient Supplied?: No

## 2023-09-08 NOTE — RESULT ENCOUNTER NOTE
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

## 2023-09-11 ENCOUNTER — TELEPHONE (OUTPATIENT)
Dept: PHYSICAL MEDICINE AND REHAB | Age: 88
End: 2023-09-11

## 2023-09-11 NOTE — TELEPHONE ENCOUNTER
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

## 2023-09-11 NOTE — TELEPHONE ENCOUNTER
Patient called back and informed her of the results of her Xray lumbar spine. Patient is aware and voiced understanding.

## 2023-09-15 ENCOUNTER — OFFICE VISIT (OUTPATIENT)
Dept: PHYSICAL MEDICINE AND REHAB | Age: 88
End: 2023-09-15

## 2023-09-15 VITALS
HEART RATE: 60 BPM | WEIGHT: 96 LBS | SYSTOLIC BLOOD PRESSURE: 120 MMHG | DIASTOLIC BLOOD PRESSURE: 78 MMHG | TEMPERATURE: 97.3 F | HEIGHT: 62 IN | BODY MASS INDEX: 17.66 KG/M2

## 2023-09-15 DIAGNOSIS — M79.609 PAIN IN EXTREMITY, UNSPECIFIED EXTREMITY: Primary | ICD-10-CM

## 2023-09-15 DIAGNOSIS — M53.3 SACROILIAC JOINT DYSFUNCTION OF RIGHT SIDE: ICD-10-CM

## 2023-09-15 RX ORDER — CLOPIDOGREL BISULFATE 75 MG/1
TABLET ORAL
COMMUNITY
Start: 2023-09-06

## 2023-09-15 RX ORDER — AMMONIUM LACTATE 12 G/100G
LOTION TOPICAL
COMMUNITY

## 2023-09-15 RX ORDER — TRIAMCINOLONE ACETONIDE 40 MG/ML
40 INJECTION, SUSPENSION INTRA-ARTICULAR; INTRAMUSCULAR ONCE
Status: COMPLETED | OUTPATIENT
Start: 2023-09-15 | End: 2023-09-15

## 2023-09-15 RX ORDER — HYDRALAZINE HYDROCHLORIDE 25 MG/1
TABLET, FILM COATED ORAL
COMMUNITY
Start: 2023-09-06

## 2023-09-15 RX ORDER — CARVEDILOL 6.25 MG/1
TABLET ORAL
COMMUNITY
Start: 2023-09-06

## 2023-09-15 RX ORDER — LIDOCAINE HYDROCHLORIDE 10 MG/ML
4 INJECTION, SOLUTION EPIDURAL; INFILTRATION; INTRACAUDAL; PERINEURAL ONCE
Status: COMPLETED | OUTPATIENT
Start: 2023-09-15 | End: 2023-09-15

## 2023-09-15 RX ORDER — ERYTHROMYCIN 5 MG/G
OINTMENT OPHTHALMIC
COMMUNITY
Start: 2023-09-01

## 2023-09-15 RX ORDER — PANTOPRAZOLE SODIUM 40 MG/1
TABLET, DELAYED RELEASE ORAL
COMMUNITY
Start: 2023-09-14

## 2023-09-15 RX ADMIN — LIDOCAINE HYDROCHLORIDE 4 ML: 10 INJECTION, SOLUTION EPIDURAL; INFILTRATION; INTRACAUDAL; PERINEURAL at 14:01

## 2023-09-15 RX ADMIN — TRIAMCINOLONE ACETONIDE 40 MG: 40 INJECTION, SUSPENSION INTRA-ARTICULAR; INTRAMUSCULAR at 14:01

## 2023-09-15 NOTE — PROGRESS NOTES
Ordering Provider: Deep Bianchi DO    NDC: 1046-9609-59    Lot#: [id number]    : [facility] [employer]    Patient Supplied?: No

## 2023-09-19 ENCOUNTER — TELEPHONE (OUTPATIENT)
Dept: PHYSICAL MEDICINE AND REHAB | Age: 88
End: 2023-09-19

## 2023-09-19 NOTE — TELEPHONE ENCOUNTER
Called and spoke with the patient and informed her of the results of xray lumbar spine and informed her to follow up with PCP regarding the osteopenia. Patient would like to know what the next step is since she is still having the problems with her him. Please advise.

## 2023-09-19 NOTE — TELEPHONE ENCOUNTER
----- Message from Ileana Zee DO sent at 9/18/2023  5:13 PM EDT -----  Reviewed test abnormal, inform patient that it showed osteopenia which she should discuss with her PCP. Also shows severe degenerative changes.

## 2023-09-21 NOTE — TELEPHONE ENCOUNTER
Called and spoke with the patient and informed her That is a pretty good result. Let's give it some time and see how long it lasts. Make sure she is doing home exercise program and tell her to call when the pain starts to recur.

## 2023-09-21 NOTE — TELEPHONE ENCOUNTER
Called and spoke with the patient and she stated that the right side is about 80% effective and the left side is 60% effective. Please advise.

## 2023-10-09 ENCOUNTER — TELEPHONE (OUTPATIENT)
Dept: PHYSICAL MEDICINE AND REHAB | Age: 88
End: 2023-10-09

## 2023-10-09 DIAGNOSIS — M47.818 ARTHRITIS OF SACROILIAC JOINT OF BOTH SIDES: Primary | ICD-10-CM

## 2023-10-09 NOTE — TELEPHONE ENCOUNTER
Patient son called and stated that the patient hip is still bothering her on the left side I informed the patient that she can not have another injection till December. Patient son stated that the Tramadol is not helping. Please advise.

## 2023-10-09 NOTE — TELEPHONE ENCOUNTER
Since the injection did not last until the next one is due, she could proceed with SI joint RFA at this point as she had 80% on right and 60% on left for effectiveness. That would last a year. She can try taking two tramadol to total 100 mg every 8 hours as needed and take with 500 mg of Tylenol.

## 2023-10-10 NOTE — TELEPHONE ENCOUNTER
Patient called back and was given the suggestions of Dr. Tierney. She would like to proceed with the RFA. She will also need to get a clearance for the Plavix from dr. Es Pérez.

## 2023-10-10 NOTE — TELEPHONE ENCOUNTER
Left message with patient to go over suggestions per Dr. Jason Hemphill will await call back from patient

## 2023-10-13 ENCOUNTER — PREP FOR PROCEDURE (OUTPATIENT)
Dept: PHYSICAL MEDICINE AND REHAB | Age: 88
End: 2023-10-13

## 2023-10-13 DIAGNOSIS — M47.818 ARTHRITIS OF SACROILIAC JOINT OF BOTH SIDES: ICD-10-CM

## 2023-10-24 RX ORDER — SODIUM CHLORIDE 9 MG/ML
INJECTION, SOLUTION INTRAVENOUS PRN
Status: CANCELLED | OUTPATIENT
Start: 2023-10-24

## 2023-10-24 RX ORDER — SODIUM CHLORIDE 0.9 % (FLUSH) 0.9 %
5-40 SYRINGE (ML) INJECTION PRN
Status: CANCELLED | OUTPATIENT
Start: 2023-10-24

## 2023-10-24 RX ORDER — SODIUM CHLORIDE 0.9 % (FLUSH) 0.9 %
5-40 SYRINGE (ML) INJECTION EVERY 12 HOURS SCHEDULED
Status: CANCELLED | OUTPATIENT
Start: 2023-10-24

## 2023-10-26 ENCOUNTER — TELEPHONE (OUTPATIENT)
Dept: PHYSICAL MEDICINE AND REHAB | Age: 88
End: 2023-10-26

## 2023-10-26 ENCOUNTER — HOSPITAL ENCOUNTER (OUTPATIENT)
Age: 88
Setting detail: OUTPATIENT SURGERY
Discharge: HOME | End: 2023-10-26
Attending: PHYSICAL MEDICINE & REHABILITATION | Admitting: PHYSICAL MEDICINE & REHABILITATION

## 2023-10-26 ENCOUNTER — HOSPITAL ENCOUNTER (OUTPATIENT)
Dept: OPERATING ROOM | Age: 88
Setting detail: OUTPATIENT SURGERY
Discharge: HOME | End: 2023-10-26
Attending: PHYSICAL MEDICINE & REHABILITATION

## 2023-10-26 VITALS
BODY MASS INDEX: 16.97 KG/M2 | OXYGEN SATURATION: 99 % | DIASTOLIC BLOOD PRESSURE: 77 MMHG | TEMPERATURE: 97.5 F | HEIGHT: 62 IN | WEIGHT: 92.2 LBS | HEART RATE: 59 BPM | RESPIRATION RATE: 16 BRPM | SYSTOLIC BLOOD PRESSURE: 157 MMHG

## 2023-10-26 DIAGNOSIS — M46.1 BILATERAL SACROILIITIS (HCC): ICD-10-CM

## 2023-10-26 RX ORDER — SODIUM CHLORIDE 0.9 % (FLUSH) 0.9 %
5-40 SYRINGE (ML) INJECTION EVERY 12 HOURS SCHEDULED
Status: DISCONTINUED | OUTPATIENT
Start: 2023-10-26 | End: 2023-10-26 | Stop reason: HOSPADM

## 2023-10-26 RX ORDER — SODIUM CHLORIDE 9 MG/ML
INJECTION, SOLUTION INTRAVENOUS PRN
Status: DISCONTINUED | OUTPATIENT
Start: 2023-10-26 | End: 2023-10-26 | Stop reason: HOSPADM

## 2023-10-26 RX ORDER — SODIUM CHLORIDE 0.9 % (FLUSH) 0.9 %
5-40 SYRINGE (ML) INJECTION PRN
Status: DISCONTINUED | OUTPATIENT
Start: 2023-10-26 | End: 2023-10-26 | Stop reason: HOSPADM

## 2023-10-26 ASSESSMENT — PAIN DESCRIPTION - DESCRIPTORS: DESCRIPTORS: ACHING

## 2023-10-26 ASSESSMENT — PAIN - FUNCTIONAL ASSESSMENT: PAIN_FUNCTIONAL_ASSESSMENT: 0-10

## 2023-10-26 NOTE — PROGRESS NOTES
Patient has two open wounds on her lower legs (calf area). They are dressed with a dressing. Patient was instructed to follow up with PCP to get the wounds checked out.  Patients son Eric also notified

## 2023-10-26 NOTE — TELEPHONE ENCOUNTER
Patients son called in stating procedure at surgery center was cancelled today since patient had active leg wounds and Dr. Mariusz Liriano told patient that she would need to be seen by PCP for that. Son said he went to urgent care and was seen by nurse practitioner and they prescribed antibiotic for 5 days and he wanted to reschedule her, explained that patient would need to be seen by her PCP after antibiotic completed and wounds would need to be closed and healed and once we receive clearance from PCP then we could reschedule her procedure and to let office know, verbalizes understanding.

## 2024-01-09 ENCOUNTER — PREP FOR PROCEDURE (OUTPATIENT)
Dept: PHYSICAL MEDICINE AND REHAB | Age: 89
End: 2024-01-09

## 2024-01-09 ENCOUNTER — TELEPHONE (OUTPATIENT)
Dept: PHYSICAL MEDICINE AND REHAB | Age: 89
End: 2024-01-09

## 2024-01-09 NOTE — TELEPHONE ENCOUNTER
Called and spoke with the patient son Prakash to inform him that I want to schedule his mother for 1-18-24.  Patient son was informed for the patient to hold plavix 7days prior to procedure which would start holding it on 1-11-24 and no 81 mg aspirin 24 hrs before the procedure.  The patient son was also informed that the surgery center will call him the day before the procedure after 3 pm to inform them of the time of procedure.  Patient son is aware and voiced understanding.

## 2024-01-17 RX ORDER — SODIUM CHLORIDE 0.9 % (FLUSH) 0.9 %
5-40 SYRINGE (ML) INJECTION EVERY 12 HOURS SCHEDULED
Status: CANCELLED | OUTPATIENT
Start: 2024-01-17

## 2024-01-17 RX ORDER — SODIUM CHLORIDE 9 MG/ML
INJECTION, SOLUTION INTRAVENOUS PRN
Status: CANCELLED | OUTPATIENT
Start: 2024-01-17

## 2024-01-17 RX ORDER — SODIUM CHLORIDE 0.9 % (FLUSH) 0.9 %
5-40 SYRINGE (ML) INJECTION PRN
Status: CANCELLED | OUTPATIENT
Start: 2024-01-17

## 2024-01-18 ENCOUNTER — HOSPITAL ENCOUNTER (OUTPATIENT)
Age: 89
Setting detail: OUTPATIENT SURGERY
Discharge: HOME | End: 2024-01-18
Attending: PHYSICAL MEDICINE & REHABILITATION | Admitting: PHYSICAL MEDICINE & REHABILITATION
Payer: MEDICARE

## 2024-01-18 ENCOUNTER — HOSPITAL ENCOUNTER (OUTPATIENT)
Dept: OPERATING ROOM | Age: 89
Setting detail: OUTPATIENT SURGERY
Discharge: HOME | End: 2024-01-18
Attending: PHYSICAL MEDICINE & REHABILITATION
Payer: MEDICARE

## 2024-01-18 VITALS
BODY MASS INDEX: 16.93 KG/M2 | WEIGHT: 92 LBS | HEIGHT: 62 IN | SYSTOLIC BLOOD PRESSURE: 210 MMHG | OXYGEN SATURATION: 98 % | TEMPERATURE: 97.2 F | RESPIRATION RATE: 16 BRPM | DIASTOLIC BLOOD PRESSURE: 84 MMHG | HEART RATE: 60 BPM

## 2024-01-18 PROCEDURE — 2500000003 HC RX 250 WO HCPCS: Performed by: PHYSICAL MEDICINE & REHABILITATION

## 2024-01-18 PROCEDURE — 64625 RF ABLTJ NRV NRVTG SI JT: CPT | Performed by: PHYSICAL MEDICINE & REHABILITATION

## 2024-01-18 PROCEDURE — 7100000011 HC PHASE II RECOVERY - ADDTL 15 MIN: Performed by: PHYSICAL MEDICINE & REHABILITATION

## 2024-01-18 PROCEDURE — 7100000010 HC PHASE II RECOVERY - FIRST 15 MIN: Performed by: PHYSICAL MEDICINE & REHABILITATION

## 2024-01-18 PROCEDURE — 3600000015 HC SURGERY LEVEL 5 ADDTL 15MIN: Performed by: PHYSICAL MEDICINE & REHABILITATION

## 2024-01-18 PROCEDURE — 2709999900 HC NON-CHARGEABLE SUPPLY: Performed by: PHYSICAL MEDICINE & REHABILITATION

## 2024-01-18 PROCEDURE — 3600000005 HC SURGERY LEVEL 5 BASE: Performed by: PHYSICAL MEDICINE & REHABILITATION

## 2024-01-18 RX ORDER — SODIUM CHLORIDE 9 MG/ML
INJECTION, SOLUTION INTRAVENOUS PRN
Status: DISCONTINUED | OUTPATIENT
Start: 2024-01-18 | End: 2024-01-18 | Stop reason: HOSPADM

## 2024-01-18 RX ORDER — LIDOCAINE HYDROCHLORIDE 20 MG/ML
INJECTION, SOLUTION EPIDURAL; INFILTRATION; INTRACAUDAL; PERINEURAL PRN
Status: DISCONTINUED | OUTPATIENT
Start: 2024-01-18 | End: 2024-01-18 | Stop reason: ALTCHOICE

## 2024-01-18 RX ORDER — SODIUM CHLORIDE 0.9 % (FLUSH) 0.9 %
5-40 SYRINGE (ML) INJECTION PRN
Status: DISCONTINUED | OUTPATIENT
Start: 2024-01-18 | End: 2024-01-18 | Stop reason: HOSPADM

## 2024-01-18 RX ORDER — SODIUM CHLORIDE 0.9 % (FLUSH) 0.9 %
5-40 SYRINGE (ML) INJECTION EVERY 12 HOURS SCHEDULED
Status: DISCONTINUED | OUTPATIENT
Start: 2024-01-18 | End: 2024-01-18 | Stop reason: HOSPADM

## 2024-01-18 RX ORDER — LIDOCAINE HYDROCHLORIDE 10 MG/ML
INJECTION, SOLUTION EPIDURAL; INFILTRATION; INTRACAUDAL; PERINEURAL PRN
Status: DISCONTINUED | OUTPATIENT
Start: 2024-01-18 | End: 2024-01-18 | Stop reason: ALTCHOICE

## 2024-01-18 ASSESSMENT — PAIN - FUNCTIONAL ASSESSMENT
PAIN_FUNCTIONAL_ASSESSMENT: NONE - DENIES PAIN
PAIN_FUNCTIONAL_ASSESSMENT: 0-10
PAIN_FUNCTIONAL_ASSESSMENT: NONE - DENIES PAIN

## 2024-01-18 NOTE — PROGRESS NOTES
Patient BP elevated 210/84. Dr Phan updtaed and request that PCP (DR Marie) be called. Per Dr Marie patient needs to go to ER for further evaluation. Dr Phan made aware and also agreeable. Ok for patient to be taken to ER by family member per physician. Patients son in law, Skip at bedside and aware. Post-op instructions given and patient discharged with family and instructed to go directly to ER.    Problem: Self Care Deficits Care Plan (Adult)  Goal: *Acute Goals and Plan of Care (Insert Text)  Occupational Therapy Goals  Initiated 8/8/2017 within 7 day(s). 1. Patient will perform bathing with setup/modified independence seated sinkside. 2. Patient will perform grooming with modified independence standing sinkside 2-3 minutes. 3. Patient will perform all aspects of toileting with modified independence. 4. Patient will perform upper extremity therapeutic exercise/activities with modified independence for 15 minutes. 5. Patient will utilize energy conservation techniques during functional activities without cue(s). Outcome: Progressing Towards Goal  OCCUPATIONAL THERAPY EVALUATION     Patient: Jessica Mendiola (50 y.o. male)  Date: 8/8/2017  Primary Diagnosis: PAM (acute kidney injury) ([facility] [address]        Precautions: R TDC (HD MWF),3  Fall      ASSESSMENT :  Based on the objective data described below, the patient presents with decreased functional strength, decreased functional balance, decreased overall activity tolerance limiting independence with ADLs. Patient returning to bed but agreeable to participating in OT assessment. Pt with significant decline in UE strength at grossly 4/5. Pt reports significant weight loss. Pt able to perform ADLs at bed/chair level with setup, but limited in functional endurance and standing balance for standing ADLs. Encouraged engagement with daughter for lifting up in bed and increasing OOB time. Will continue to assess bathing goals as unsure if pt will get AVF or remain with [facility] and will require long-term sink bathing. Pt would benefit from continued OT services to maximize independence and adaptive strategies to safely perform ADLs. Education: Reviewed home safety, body mechanics, Energy Conservation/Work Simplification Techniques, and adaptive dressing techniques with patient verbalizing  understanding at this time.       Patient will benefit from skilled intervention to address the above impairments. Patients rehabilitation potential is considered to be Good  Factors which may influence rehabilitation potential include:   [ ]             None noted  [ ]             Mental ability/status  [X]             Medical condition  [ ]             Home/family situation and support systems  [ ]             Safety awareness  [ ]             Pain tolerance/management  [ ]             Other:        PLAN :  Recommendations and Planned Interventions:  [X]               Self Care Training                  [X]        Therapeutic Activities  [X]               Functional Mobility Training    [X]        Cognitive Retraining  [X]               Therapeutic Exercises           [X]        Endurance Activities  [X]               Balance Training                   [X]        Neuromuscular Re-Education  [ ]               Visual/Perceptual Training     [X]   Home Safety Training  [X]               Patient Education                 [X]        Family Training/Education  [ ]               Other (comment):     Frequency/Duration: Patient will be followed by occupational therapy 3 times a week to address goals. Discharge Recommendations: Home Health  Further Equipment Recommendations for Discharge: N/A       SUBJECTIVE:   Patient stated I've lost a lot of weight. I used to weight 152.       OBJECTIVE DATA SUMMARY:       Past Medical History:   Diagnosis Date    Hypertension      Non compliance w medication regimen       noncompliant with HTN meds   History reviewed. No pertinent surgical history. Barriers to Learning/Limitations: None  Compensate with: visual, verbal, tactile, kinesthetic cues/model     G-Codes (GP)  Self Care   Current  CI= 1-19%   Goal  CI= 1-19%  The severity rating is based on the professional judgement & direct observation of Level of Assistance required for Functional Mobility and ADLs.      Eval Complexity: History: MEDIUM Complexity : Expanded review of history including physical, cognitive and psychosocial  history ; Examination: MEDIUM Complexity : 3-5 performance deficits relating to physical, cognitive , or psychosocial skils that result in activity limitations and / or participation restrictions; Decision Making:MEDIUM Complexity : Patient may present with comorbidities that affect occupational performnce. Miniml to moderate modification of tasks or assistance (eg, physical or verbal ) with assesment(s) is necessary to enable patient to complete evaluation      Prior Level of Function/Home Situation: Pt w/ supportive Fiance' at home and young ~3 yo daughter  Home Situation  Home Environment: Private residence  # Steps to Enter: 13  Rails to Enter: Yes  Hand Rails : Right  One/Two Story Residence: One story  # of Interior Steps: 0  Height of Each Step (in): 6 inches  Interior Rails: None  Lift Chair Available: No  Living Alone: No  Support Systems: Spouse/Significant Other/Partner, Family member(s)  Patient Expects to be Discharged to[de-identified] Private residence  Current DME Used/Available at Home: None  Tub or Shower Type: Tub/Shower combination  [X]  Right hand dominant          [ ]  Left hand dominant  Cognitive/Behavioral Status:  Neurologic State: Alert; Appropriate for age  Orientation Level: Oriented X4;Appropriate for age  Cognition: Appropriate decision making; Appropriate for age attention/concentration; Appropriate safety awareness  Safety/Judgement: Awareness of environment  Coordination:  Coordination: Within functional limits  Fine Motor Skills-Upper: Left Intact; Right Intact    Gross Motor Skills-Upper: Left Intact; Right Intact  Balance:  Sitting: Intact  Standing: Intact; With support  Standing - Static: Good  Standing - Dynamic : Fair  Strength:  Strength: Generally decreased, functional  Tone & Sensation:  Tone: Normal  Sensation: Impaired  Range of Motion:  AROM: Generally decreased, functional  Functional Mobility and Transfers for ADLs:  Bed Mobility:  Supine to Sit: Modified independent  Sit to Supine: Modified independent  Transfers:  Sit to Stand: Supervision              Toilet Transfer : Supervision              Bathroom Mobility: Supervision/set up  ADL Assessment:   Feeding: Setup     Oral Facial Hygiene/Grooming: Setup     Bathing: Setup     Upper Body Dressing: Setup     Lower Body Dressing: Setup     Toileting: Stand by assistance     ADL Intervention:  Upper Body Dressing Assistance  Pullover Shirt: Supervision/set-up     Lower Body Dressing Assistance  Socks: Supervision/set-up  Position Performed: Long sitting on bed     Cognitive Retraining  Safety/Judgement: Awareness of environment     Therapeutic Exercise:  Patient instructed on functional strengthening including lifting daughter and upright against gravity functional activities     Pain:  Pre-treatment: 0/10  Post-treatment: 0/10     Activity Tolerance:   Pt able to stand <1 minute(s). Pt able to complete ADLs with intermittent rest breaks. Pt limited by poor functional endurance to sustain activity. Pt unsteady      Please refer to the flowsheet for vital signs taken during this treatment. After treatment:   [ ] Patient left in no apparent distress sitting up in chair  [X] Patient left in no apparent distress in bed  [X] Call bell left within reach  [X] Nursing notified  [X] Caregiver present/fiance & children  [ ] Bed alarm activated      COMMUNICATION/EDUCATION:   [X] Home safety education was provided and the patient/caregiver indicated understanding. [X] Patient/family have participated as able in goal setting and plan of care. [X] Patient/family agree to work toward stated goals and plan of care. [ ] Patient understands intent and goals of therapy, but is neutral about his/her participation. [ ] Patient is unable to participate in goal setting and plan of care.      Thank you for this referral.  Kika Hsu, OTR/L  Time Calculation: 10 mins

## 2024-01-18 NOTE — OP NOTE
Yazmin Mahoney     1/18/2024     PRE-OPERATIVE DIAGNOSIS: Sacroiliitis, Sacral spondylosis, Lumbosacral disc degeneration     POST-OPERATIVE DIAGNOSIS: Same     PROCEDURE: Fluoroscopic-guided Bilateral lateral sacral nerve neurolysis at vertebral levels S1, S2, and S3 and medial branch block L5 dorsal primary ramus.     SURGEON: Rachell Weaver DO    ANESTHESIA: Local      ESTIMATED BLOOD LOSS: None.   ______________________________________________________________________   HISTORY & PHYSICAL:  see separate H&P    PROCEDURE: After confirming written and informed consent, Yazmin was brought to the procedure room and placed in the prone position. Blood pressure, heart rate, O2 saturation, and visual and verbal monitoring were established. A time-out was performed and her name and date of birth, the procedure to be performed as well as the plan for the location of the needle insertion were confirmed.     Fluoroscopy was utilized to delineate the anatomy of the lumbosacral spine. Surface landmarks were identified as well. After prep and drape, an oblique fluoroscopic view was created to optimize visualization of the Bilateral sacroiliac joint.     After infiltration of local (1cc 1% lidocaine), a # 20-gauge 100-mm 10- mm active tip radiofrequency ablation needle was passed to position the tip at the sacral ala level at the base of the superior articulating process where the medial branch traverses. Satisfactory needle placement was confirmed by AP, lateral and oblique projections. The second needle was passed to position at the medial aspect of the sacroiliac joint, along the course of the lateral sacral nerve of S1. Satisfactory needle placement was confirmed by AP, lateral and oblique projections. The above procedure was then conducted at the S2 and S3 levels. Sensory and motor testing was then performed at each level. This testing reproduced pain in the Bilateral sacroiliac region. No dysesthesias were

## 2024-01-18 NOTE — H&P
Rachell Weaver DO  [facility]  [address] [place] [address]  Phone: [phone]  Fax: [phone]    PCP: Rachelle Mraie MD  Date of visit: 1/18/2024    CC: low back pain       Yazmin Mahoney is a 88 y.o. female who presents today for bilateral sacroiliac joint RFA.  No red flag symptoms. Consents to proceed with procedure.     Allergies   Allergen Reactions    Atorvastatin      Rash    Clonidine      Dry Mouth, Tongue Sores, Bad Mood--6/2013    Ezetimibe      Fatigue    Fenofibrate      Dizzy Tired Confusion    Lisinopril-Hydrochlorothiazide      Headaches/ Eye Swells    Lovastatin      Diarrhea    Metronidazole      lips swollen    Niacin     Pravachol  [Pravastatin Sodium]      Rash/ Arms And Hands Swelling    Rosuvastatin      11/2014    Spironolactone      Bloating/Nausea    Welchol  [Colesevelam Hcl]      Tired/ Constipation    Ibuprofen Nausea And Vomiting and Other (See Comments)     Abdominal pain    Statins Rash     Rash, severe joint pain       Current Facility-Administered Medications   Medication Dose Route Frequency Provider Last Rate Last Admin    sodium chloride flush 0.9 % injection 5-40 mL  5-40 mL IntraVENous 2 times per day Rachell eWaver DO        sodium chloride flush 0.9 % injection 5-40 mL  5-40 mL IntraVENous PRN Rachell Weaver DO        0.9 % sodium chloride infusion   IntraVENous PRN Rachell Weaver DO           Past Medical History:   Diagnosis Date    Anxiety     Arthritis     CAD (coronary artery disease)     Cancer (HCC)     skin    Colitis     Depression     GERD (gastroesophageal reflux disease)     Hyperlipidemia     Hypertension     PVD (peripheral vascular disease) (HCC)     TIA (transient ischemic attack)     2011   NO DEFICITS       Past Surgical History:   Procedure Laterality Date    COLONOSCOPY      CORONARY ANGIOPLASTY WITH STENT PLACEMENT  2014    X3 STENTS

## 2024-02-01 ENCOUNTER — OFFICE VISIT (OUTPATIENT)
Dept: PHYSICAL MEDICINE AND REHAB | Age: 89
End: 2024-02-01

## 2024-02-01 VITALS
WEIGHT: 97 LBS | BODY MASS INDEX: 17.85 KG/M2 | DIASTOLIC BLOOD PRESSURE: 80 MMHG | HEIGHT: 62 IN | SYSTOLIC BLOOD PRESSURE: 160 MMHG | HEART RATE: 52 BPM

## 2024-02-01 DIAGNOSIS — M25.551 RIGHT HIP PAIN: ICD-10-CM

## 2024-02-01 DIAGNOSIS — M79.606 PAIN OF LOWER EXTREMITY, UNSPECIFIED LATERALITY: Primary | ICD-10-CM

## 2024-02-01 RX ORDER — PREDNISOLONE ACETATE 10 MG/ML
SUSPENSION/ DROPS OPHTHALMIC
COMMUNITY
Start: 2023-11-01

## 2024-02-01 RX ORDER — ONDANSETRON 4 MG/1
TABLET, FILM COATED ORAL
COMMUNITY
Start: 2023-12-01

## 2024-02-01 RX ORDER — TRIAMCINOLONE ACETONIDE 40 MG/ML
40 INJECTION, SUSPENSION INTRA-ARTICULAR; INTRAMUSCULAR ONCE
Status: COMPLETED | OUTPATIENT
Start: 2024-02-01 | End: 2024-02-01

## 2024-02-01 RX ORDER — LIDOCAINE HYDROCHLORIDE 10 MG/ML
8 INJECTION, SOLUTION EPIDURAL; INFILTRATION; INTRACAUDAL; PERINEURAL ONCE
Status: COMPLETED | OUTPATIENT
Start: 2024-02-01 | End: 2024-02-01

## 2024-02-01 RX ADMIN — LIDOCAINE HYDROCHLORIDE 8 ML: 10 INJECTION, SOLUTION EPIDURAL; INFILTRATION; INTRACAUDAL; PERINEURAL at 11:33

## 2024-02-01 RX ADMIN — TRIAMCINOLONE ACETONIDE 40 MG: 40 INJECTION, SUSPENSION INTRA-ARTICULAR; INTRAMUSCULAR at 11:34

## 2024-02-01 NOTE — PROGRESS NOTES
Gina Gillette D.O.  [facility]  [address] [place] [address]  Phone: [phone]  Fax: [phone]    2/3/2024  Consult requested by: No referring provider defined for this encounter. for :   Chief Complaint   Patient presents with    Hip Pain     Injection f/u pain in rt hip      Primary care: Rachelle Marie MD    An independent historian was not needed.    Hand dominance: RIGHT HAND     Interval history Back pain was relieved by bilateral SIJ RFA.  Now having right anterior hip pain after no new injury. She reports Tylenol is helping her more than tramadol.     Location: right hip    Onset: suddenly after no known injury several months ago.    Severity: Pain Score:   2    Quality: aching.      Course: unchanged     Frequency: episodic and occurs daily    Alleviating factors: nothing    Exacerbating factors: laying down    Red flags: Not present: history of cancer, pain awakening patient from sleep, night sweats, fevers, unintentional weight loss, immunospuression, saddle anesthesia, new bowel or bladder dysfunction, and osteoporosis.       Associated symptoms: Not present: falls, subjective weakness, paresthesias, hematuria, nausea, vomiting or rash.     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

## 2024-02-01 NOTE — PROGRESS NOTES
Called Dr. Marie's office and spoke with Natalie. Notified of elevated blood pressure 201/71 and 200/80. Patient denies symptoms of elevated blood pressure. Appointment given to see her PCP today at 12:30 pm and patient agreed. Patient declines to go to ER

## 2024-02-12 ENCOUNTER — TELEPHONE (OUTPATIENT)
Dept: PHYSICAL MEDICINE AND REHAB | Age: 89
End: 2024-02-12

## 2024-02-12 DIAGNOSIS — Z78.0 OSTEOPENIA AFTER MENOPAUSE: ICD-10-CM

## 2024-02-12 DIAGNOSIS — M85.80 OSTEOPENIA AFTER MENOPAUSE: ICD-10-CM

## 2024-02-12 DIAGNOSIS — M85.80 OSTEOPENIA, UNSPECIFIED LOCATION: Primary | ICD-10-CM

## 2024-02-12 NOTE — TELEPHONE ENCOUNTER
----- Message from Gina Gillette DO sent at 2/9/2024  5:36 PM EST -----  Reviewed test abnormal, inform patient that it showed degenerative changes and osteopenia. Please see if patient has had a dexa and if yes get me the result.

## 2024-02-12 NOTE — TELEPHONE ENCOUNTER
Called and spoke with the patient daughter Zee and was informed of results.  Patient daughter Zee stated that she has not had a DEXA scan.  Please advise.

## 2024-02-13 NOTE — TELEPHONE ENCOUNTER
Patient son called back and informed him that dexa scan was ordered and to call [facility] to get scheduled for patient   he verbalizes understanding

## 2024-03-25 ENCOUNTER — HOSPITAL ENCOUNTER (OUTPATIENT)
Dept: MAMMOGRAPHY | Age: 89
Discharge: HOME | End: 2024-03-27
Attending: PHYSICAL MEDICINE & REHABILITATION
Payer: MEDICARE

## 2024-03-25 DIAGNOSIS — M85.80 OSTEOPENIA, UNSPECIFIED LOCATION: ICD-10-CM

## 2024-03-25 DIAGNOSIS — M85.80 OSTEOPENIA AFTER MENOPAUSE: ICD-10-CM

## 2024-03-25 DIAGNOSIS — Z78.0 OSTEOPENIA AFTER MENOPAUSE: ICD-10-CM

## 2024-03-25 PROCEDURE — 77080 DXA BONE DENSITY AXIAL: CPT

## 2024-03-29 ENCOUNTER — TELEPHONE (OUTPATIENT)
Dept: PHYSICAL MEDICINE AND REHAB | Age: 89
End: 2024-03-29

## 2024-03-29 NOTE — RESULT ENCOUNTER NOTE
Reviewed test abnormal, inform patient that it showed osteoporosis with increased risk of fracture.

## 2024-03-29 NOTE — TELEPHONE ENCOUNTER
Daughter informed of test results and states her mother seen her PCP and they are aware, no questions voiced

## 2024-03-29 NOTE — TELEPHONE ENCOUNTER
----- Message from Gina Gillette DO sent at 3/29/2024 12:01 PM EDT -----  Reviewed test abnormal, inform patient that it showed osteoporosis with increased risk of fracture.

## 2024-06-18 ENCOUNTER — OFFICE VISIT (OUTPATIENT)
Dept: ORTHOPEDIC SURGERY | Age: 89
End: 2024-06-18
Payer: MEDICARE

## 2024-06-18 VITALS — HEIGHT: 62 IN | WEIGHT: 97 LBS | BODY MASS INDEX: 17.85 KG/M2

## 2024-06-18 DIAGNOSIS — M70.21 OLECRANON BURSITIS OF RIGHT ELBOW: Primary | ICD-10-CM

## 2024-06-18 PROCEDURE — G8419 CALC BMI OUT NRM PARAM NOF/U: HCPCS | Performed by: ORTHOPAEDIC SURGERY

## 2024-06-18 PROCEDURE — 4004F PT TOBACCO SCREEN RCVD TLK: CPT | Performed by: ORTHOPAEDIC SURGERY

## 2024-06-18 PROCEDURE — G8427 DOCREV CUR MEDS BY ELIG CLIN: HCPCS | Performed by: ORTHOPAEDIC SURGERY

## 2024-06-18 PROCEDURE — 1090F PRES/ABSN URINE INCON ASSESS: CPT | Performed by: ORTHOPAEDIC SURGERY

## 2024-06-18 PROCEDURE — 99213 OFFICE O/P EST LOW 20 MIN: CPT | Performed by: ORTHOPAEDIC SURGERY

## 2024-06-18 PROCEDURE — 1123F ACP DISCUSS/DSCN MKR DOCD: CPT | Performed by: ORTHOPAEDIC SURGERY

## 2024-06-18 NOTE — PROGRESS NOTES
, Rfl:     ammonium lactate (LAC-HYDRIN) 12 % lotion, APPLY LOTION TOPICALLY TWICE DAILY, Disp: , Rfl:     clopidogrel (PLAVIX) 75 MG tablet, , Disp: , Rfl:     carvedilol (COREG) 6.25 MG tablet, , Disp: , Rfl:     erythromycin (ROMYCIN) 5 MG/GM ophthalmic ointment, , Disp: , Rfl:     hydrALAZINE (APRESOLINE) 25 MG tablet, , Disp: , Rfl:     pantoprazole (PROTONIX) 40 MG tablet, , Disp: , Rfl:     budesonide (ENTOCORT EC) 3 MG extended release capsule, , Disp: , Rfl: 0    metoprolol tartrate (LOPRESSOR) 25 MG tablet, Take 1 tablet by mouth 2 times daily Takes 1/2 tab, Disp: , Rfl:     isosorbide mononitrate (IMDUR) 30 MG extended release tablet, Take 1 tablet by mouth 2 times daily, Disp: , Rfl:     potassium chloride (KLOR-CON M) 20 MEQ extended release tablet, Take 1 tablet by mouth daily Takes 2 tabs every other day alternating with 1 tab, Disp: , Rfl:     sertraline (ZOLOFT) 50 MG tablet, take 1 tablet by mouth once daily for MOOD, Disp: , Rfl: 0    losartan (COZAAR) 100 MG tablet, Take 1 tablet by mouth daily, Disp: , Rfl:     Multiple Vitamins-Minerals (THERAPEUTIC MULTIVITAMIN-MINERALS) tablet, Take 1 tablet by mouth daily, Disp: , Rfl:     amLODIPine (NORVASC) 10 MG tablet, Take 1 tablet by mouth daily, Disp: , Rfl:     LORazepam (ATIVAN) 1 MG tablet, Take 1 tablet by mouth 2 times daily as needed for Anxiety. Takes 1/2 tab, Disp: , Rfl:     aspirin EC 81 MG EC tablet, Take 1 tablet by mouth daily for 28 days, Disp: 56 tablet, Rfl: 0  Allergies   Allergen Reactions    Atorvastatin      Rash    Clonidine      Dry Mouth, Tongue Sores, Bad Mood--6/2013    Ezetimibe      Fatigue    Fenofibrate      Dizzy Tired Confusion    Lisinopril-Hydrochlorothiazide      Headaches/ Eye Swells    Lovastatin      Diarrhea    Metronidazole      lips swollen    Niacin     Pravachol  [Pravastatin Sodium]      Rash/ Arms And Hands Swelling    Rosuvastatin      11/2014    Spironolactone      Bloating/Nausea    Welchol  [Colesevelam

## (undated) DEVICE — PAD,ABDOMINAL,8"X10",ST,LF: Brand: MEDLINE

## (undated) DEVICE — 3 ML SYRINGE LUER-LOCK TIP: Brand: MONOJECT

## (undated) DEVICE — NEEDLE HYPO 25GA L1.5IN BLU POLYPR HUB S STL REG BVL STR

## (undated) DEVICE — CVD CANNULA

## (undated) DEVICE — ELECTRODE SURG MPLR NEUT SELF ADH PT PLT MULTIGEN

## (undated) DEVICE — HYPODERMIC SAFETY NEEDLE: Brand: MAGELLAN

## (undated) DEVICE — DRAPE SHEET, X-LARGE: Brand: CONVERTORS

## (undated) DEVICE — BANDAGE COMPR W6INXL5YD SELF ADH COHESIVE CO FLX

## (undated) DEVICE — SHEET,DRAPE,40X58,STERILE: Brand: MEDLINE

## (undated) DEVICE — DOUBLE BASIN SET: Brand: MEDLINE INDUSTRIES, INC.

## (undated) DEVICE — TOWEL OR BLUEE 16X26IN ST 8 PACK ORB08 16X26ORTWL

## (undated) DEVICE — SYRINGE, LUER LOCK, 10ML: Brand: MEDLINE

## (undated) DEVICE — Z DISCONTINUED USE 2275686 GLOVE SURG SZ 8 L12IN FNGR THK13MIL WHT ISOLEX POLYISOPRENE

## (undated) DEVICE — NDL CNTR 40CT FM MAG: Brand: MEDLINE INDUSTRIES, INC.

## (undated) DEVICE — NEEDLE SPNL L3.5IN PNK HUB S STL REG WALL FIT STYL W/ QNCKE

## (undated) DEVICE — BANDAGE COMPR L W4INXL11YD 100% COT WVN E DBL LEN CLP CLSR

## (undated) DEVICE — 6 ML SYRINGE LUER-LOCK TIP: Brand: MONOJECT

## (undated) DEVICE — BANDAGE ADH W1XL3IN NAT FAB WVN FLX DURABLE N ADH PD SEAL

## (undated) DEVICE — Z DISCONTINUED PER MEDLINE USE 2741944 DRESSING AQUACEL 12 IN SURG W9XL30CM SIL CVR WTRPRF VIR BACT BARR ANTIMIC

## (undated) DEVICE — GAUZE,SPONGE,4"X4",12PLY,STERILE,LF,2'S: Brand: MEDLINE

## (undated) DEVICE — Z DISCONTINUED APPLICATOR SURG PREP 0.35OZ 2% CHG 70% ISO ALC W/ HI LT

## (undated) DEVICE — TUBING PMP L16FT MAIN DISP FOR AR-6400 AR-6475

## (undated) DEVICE — CAMERA STRYKER 1488 HD GEN

## (undated) DEVICE — BLADE SHVR AGRSV + RED BL 4.0MM

## (undated) DEVICE — INTENDED FOR TISSUE SEPARATION, AND OTHER PROCEDURES THAT REQUIRE A SHARP SURGICAL BLADE TO PUNCTURE OR CUT.: Brand: BARD-PARKER ® STAINLESS STEEL BLADES

## (undated) DEVICE — SET SURG INSTR DISSECT

## (undated) DEVICE — TOWEL,OR,DSP,ST,BLUE,STD,6/PK,12PK/CS: Brand: MEDLINE

## (undated) DEVICE — GAUZE,SPONGE,4"X4",16PLY,STRL,LF,10/TRAY: Brand: MEDLINE

## (undated) DEVICE — Z DISCONTINUED USE 2132709 NEEDLE HYPO 18GA L1.5IN PNK POLYPR HUB S STL THN WALL FILL

## (undated) DEVICE — MARKER,SKIN,WI/RULER AND LABELS: Brand: MEDLINE

## (undated) DEVICE — GOWN,SIRUS,POLYRNF,BRTHSLV,XLN/XL,20/CS: Brand: MEDLINE

## (undated) DEVICE — PACK,EXTREMITY,ORTHOMAX,5/CS: Brand: MEDLINE

## (undated) DEVICE — Z INACTIVE USE 2660664 SOLUTION IRRIG 3000ML 0.9% SOD CHL USP UROMATIC PLAS CONT

## (undated) DEVICE — PADDING,UNDERCAST,COTTON, 4"X4YD STERILE: Brand: MEDLINE

## (undated) DEVICE — NEEDLE HYPO 18GA L1.5IN PNK POLYPR HUB S STL THN WALL FILL

## (undated) DEVICE — GAUZE,SPONGE,4"X4",16PLY,XRAY,STRL,LF: Brand: MEDLINE

## (undated) DEVICE — BANDAGE COMPR W6INXL12FT SMOOTH FOR LIMB EXSANG ESMARCH

## (undated) DEVICE — 3M™ STERI-DRAPE™ U-DRAPE 1015: Brand: STERI-DRAPE™

## (undated) DEVICE — CHLORAPREP 26ML ORANGE

## (undated) DEVICE — TUBING, SUCTION, 1/4" X 10', STRAIGHT: Brand: MEDLINE

## (undated) DEVICE — COVER,LIGHT HANDLE,FLX,1/PK: Brand: MEDLINE INDUSTRIES, INC.